# Patient Record
Sex: MALE | Race: WHITE | NOT HISPANIC OR LATINO | Employment: OTHER | URBAN - METROPOLITAN AREA
[De-identification: names, ages, dates, MRNs, and addresses within clinical notes are randomized per-mention and may not be internally consistent; named-entity substitution may affect disease eponyms.]

---

## 2017-01-03 RX ORDER — GABAPENTIN 600 MG/1
TABLET ORAL
Qty: 120 TABLET | Refills: 0 | Status: SHIPPED | OUTPATIENT
Start: 2017-01-03 | End: 2017-06-30 | Stop reason: SDUPTHER

## 2017-01-04 RX ORDER — DICYCLOMINE HYDROCHLORIDE 10 MG/1
CAPSULE ORAL
Qty: 120 CAPSULE | Refills: 1 | Status: SHIPPED | OUTPATIENT
Start: 2017-01-04

## 2017-01-31 ENCOUNTER — OFFICE VISIT (OUTPATIENT)
Dept: FAMILY MEDICINE CLINIC | Facility: CLINIC | Age: 62
End: 2017-01-31

## 2017-01-31 VITALS
RESPIRATION RATE: 16 BRPM | HEIGHT: 71 IN | OXYGEN SATURATION: 98 % | HEART RATE: 80 BPM | WEIGHT: 193 LBS | BODY MASS INDEX: 27.02 KG/M2 | DIASTOLIC BLOOD PRESSURE: 70 MMHG | TEMPERATURE: 98.3 F | SYSTOLIC BLOOD PRESSURE: 118 MMHG

## 2017-01-31 DIAGNOSIS — G89.29 CHRONIC LOW BACK PAIN WITHOUT SCIATICA, UNSPECIFIED BACK PAIN LATERALITY: Primary | ICD-10-CM

## 2017-01-31 DIAGNOSIS — M54.50 CHRONIC LOW BACK PAIN WITHOUT SCIATICA, UNSPECIFIED BACK PAIN LATERALITY: Primary | ICD-10-CM

## 2017-01-31 DIAGNOSIS — J01.10 ACUTE NON-RECURRENT FRONTAL SINUSITIS: ICD-10-CM

## 2017-01-31 PROBLEM — J01.90 ACUTE SINUSITIS: Status: ACTIVE | Noted: 2017-01-31

## 2017-01-31 PROCEDURE — 99213 OFFICE O/P EST LOW 20 MIN: CPT | Performed by: INTERNAL MEDICINE

## 2017-01-31 RX ORDER — HYDROCODONE BITARTRATE AND ACETAMINOPHEN 10; 325 MG/1; MG/1
1 TABLET ORAL
Qty: 150 TABLET | Refills: 0 | Status: SHIPPED | OUTPATIENT
Start: 2017-01-31 | End: 2017-03-08 | Stop reason: SDUPTHER

## 2017-01-31 RX ORDER — AMOXICILLIN AND CLAVULANATE POTASSIUM 875; 125 MG/1; MG/1
1 TABLET, FILM COATED ORAL 2 TIMES DAILY
Qty: 20 TABLET | Refills: 0 | Status: SHIPPED | OUTPATIENT
Start: 2017-01-31 | End: 2017-06-30

## 2017-01-31 NOTE — MR AVS SNAPSHOT
Octavio Espinoza   1/31/2017 9:45 AM   Office Visit    Dept Phone:  [phone]   Encounter #:  [id number]    Provider:  Freddie Coreas MD   Department:  [facility]                Your Full Care Plan              Today's Medication Changes          These changes are accurate as of: 1/31/17 11:12 AM.  If you have any questions, ask your nurse or doctor.               New Medication(s)Ordered:     amoxicillin-clavulanate 875-125 MG per tablet   Commonly known as:  AUGMENTIN   Take 1 tablet by mouth 2 (Two) Times a Day.   Started by:  Freddie Coreas MD            Where to Get Your Medications      These medications were sent to [facility]/pharmacy #[id number] - [place], [place] - [address] - [phone]  - [phone]   [address], [facility] [address]     Phone:  [phone]     amoxicillin-clavulanate 875-125 MG per tablet         You can get these medications from any pharmacy     Bring a paper prescription for each of these medications     HYDROcodone-acetaminophen  MG per tablet                  Your Updated Medication List          This list is accurate as of: 1/31/17 11:12 AM.  Always use your most recent med list.                albuterol 108 (90 BASE) MCG/ACT inhaler   Commonly known as:  PROVENTIL HFA;VENTOLIN HFA   Inhale 2 puffs every 4 (four) hours as needed for wheezing.       ALPRAZolam 1 MG tablet   Commonly known as:  XANAX   Take 1 tablet by mouth daily as needed for anxiety.       amoxicillin-clavulanate 875-125 MG per tablet   Commonly known as:  AUGMENTIN   Take 1 tablet by mouth 2 (Two) Times a Day.       buPROPion  MG 12 hr tablet   Commonly known as:  WELLBUTRIN SR   TAKE 1 TABLET BY MOUTH EVERY DAY       dicyclomine 10 MG capsule   Commonly known as:  BENTYL   TAKE 1 CAPSULE BY MOUTH 4 TIMES DAILY BEFORE MEALS AND AT BEDTIME       finasteride 5 MG tablet   Commonly known as:  PROSCAR   Take 1 tablet by  mouth Daily.       * gabapentin 600 MG tablet   Commonly known as:  NEURONTIN   Take 1 tablet by mouth 4 (Four) Times a Day.       * gabapentin 600 MG tablet   Commonly known as:  NEURONTIN   TAKE 1 TABLET BY MOUTH 4 TIMES A DAY       HYDROcodone-acetaminophen  MG per tablet   Commonly known as:  NORCO   Take 1 tablet by mouth Every 3 (Three) Hours.       methylphenidate 10 MG tablet   Commonly known as:  RITALIN   Take 1 tablet by mouth 2 (Two) Times a Day.       terazosin 10 MG capsule   Commonly known as:  HYTRIN   Take 1 capsule by mouth Every Night.       * Notice:  This list has 2 medication(s) that are the same as other medications prescribed for you. Read the directions carefully, and ask your doctor or other care provider to review them with you.            Instructions     None    Patient Instructions History      Upcoming Appointments     Visit Type Date Time Department    OFFICE VISIT 2017  9:45 AM [employer] Signup     [facility] [employer] allows you to send messages to your doctor, view your test results, renew your prescriptions, schedule appointments, and more. To sign up, go to [employer] and click on the Sign Up Now link in the New User? box. Enter your [employer] Activation Code exactly as it appears below along with the last four digits of your Social Security Number and your Date of Birth () to complete the sign-up process. If you do not sign up before the expiration date, you must request a new code.    [employer] Activation Code: [email or web address]  Expires: 2017 11:12 AM    If you have questions, you can email [email or web address] or call [phone] to talk to our [employer] staff. Remember, [employer] is NOT to be used for urgent needs. For medical emergencies, dial 911.               Other Info from Your Visit           Allergies     No Known Allergies      Reason for Visit     Cough cough and congestion       Vital Signs     Blood  "Pressure Pulse Temperature Respirations Height Weight    118/70 80 98.3 °F (36.8 °C) (Oral) 16 70.5\" (179.1 cm) 193 lb (87.5 kg)    Oxygen Saturation Body Mass Index Smoking Status             98% 27.3 kg/m2 Never Smoker           "

## 2017-01-31 NOTE — PROGRESS NOTES
Subjective   Octavio Espinoza is a 62 y.o. male. Patient is here today for   Chief Complaint   Patient presents with   • Cough     cough and congestion           Vitals:    01/31/17 0922   BP: 118/70   Pulse: 80   Resp: 16   Temp: 98.3 °F (36.8 °C)   SpO2: 98%       Past Medical History   Diagnosis Date   • ADHD (attention deficit hyperactivity disorder)    • Anxiety    • Back pain    • DDD (degenerative disc disease), cervical    • Depression    • Erectile dysfunction       No Known Allergies   Social History     Social History   • Marital status:      Spouse name: N/A   • Number of children: N/A   • Years of education: N/A     Occupational History   • Not on file.     Social History Main Topics   • Smoking status: Never Smoker   • Smokeless tobacco: Not on file   • Alcohol use No   • Drug use: Not on file   • Sexual activity: Not on file     Other Topics Concern   • Not on file     Social History Narrative        Current Outpatient Prescriptions:   •  albuterol (PROVENTIL HFA;VENTOLIN HFA) 108 (90 BASE) MCG/ACT inhaler, Inhale 2 puffs every 4 (four) hours as needed for wheezing., Disp: 1 inhaler, Rfl: 11  •  ALPRAZolam (XANAX) 1 MG tablet, Take 1 tablet by mouth daily as needed for anxiety., Disp: 30 tablet, Rfl: 2  •  buPROPion SR (WELLBUTRIN SR) 150 MG 12 hr tablet, TAKE 1 TABLET BY MOUTH EVERY DAY, Disp: 30 tablet, Rfl: 2  •  dicyclomine (BENTYL) 10 MG capsule, TAKE 1 CAPSULE BY MOUTH 4 TIMES DAILY BEFORE MEALS AND AT BEDTIME, Disp: 120 capsule, Rfl: 1  •  finasteride (PROSCAR) 5 MG tablet, Take 1 tablet by mouth Daily., Disp: 90 tablet, Rfl: 3  •  gabapentin (NEURONTIN) 600 MG tablet, Take 1 tablet by mouth 4 (Four) Times a Day., Disp: 360 tablet, Rfl: 3  •  gabapentin (NEURONTIN) 600 MG tablet, TAKE 1 TABLET BY MOUTH 4 TIMES A DAY, Disp: 120 tablet, Rfl: 0  •  HYDROcodone-acetaminophen (NORCO)  MG per tablet, Take 1 tablet by mouth Every 3 (Three) Hours., Disp: 150 tablet, Rfl: 0  •   methylphenidate (RITALIN) 10 MG tablet, Take 1 tablet by mouth 2 (Two) Times a Day., Disp: 60 tablet, Rfl: 0  •  terazosin (HYTRIN) 10 MG capsule, Take 1 capsule by mouth Every Night., Disp: 90 capsule, Rfl: 3  •  amoxicillin-clavulanate (AUGMENTIN) 875-125 MG per tablet, Take 1 tablet by mouth 2 (Two) Times a Day., Disp: 20 tablet, Rfl: 0     Objective     HPI Comments: He has an acute issue and a chronic one today.  Acutely, he has had some right and left maxillary sinus pain and pressure with purulent nasal drainage for about 10 days.  His get some drainage in the back of his throat although he denies a sore throat or ear pain.    He has chronic cervical spine pain and lumbar spine pain.  He takes hydrocodone 10/325 3 times a day when necessary for this.  He requested refill on his narcotic pain medication.    Cough   Associated symptoms include postnasal drip and rhinorrhea. Pertinent negatives include no ear pain, sore throat or shortness of breath.        Review of Systems   Constitutional: Negative.    HENT: Positive for congestion, postnasal drip, rhinorrhea and sinus pressure. Negative for dental problem, ear discharge, ear pain, facial swelling, hearing loss and sore throat.    Respiratory: Positive for cough. Negative for choking and shortness of breath.    Psychiatric/Behavioral: Negative.        Physical Exam   Constitutional: He is oriented to person, place, and time.   HENT:   Head: Normocephalic and atraumatic.   Right Ear: External ear normal.   Left Ear: External ear normal.   He had some pain on palpation the right maxillary sinus.   Cardiovascular: Normal rate and regular rhythm.    Pulmonary/Chest: Effort normal and breath sounds normal.   Neurological: He is alert and oriented to person, place, and time.   Psychiatric: He has a normal mood and affect. His behavior is normal.   Nursing note and vitals reviewed.        Problem List Items Addressed This Visit        Respiratory    Acute sinusitis        Nervous and Auditory    Low back pain - Primary            PLAN  I refilled his hydrocodone 10/325 3 times a day when necessary for his chronic low back pain.    I asked him to take Augmentin 875 one by mouth twice a day #20 with no refills.  I cautioned him that this medication might cause diarrhea.    He could decrease the chance that he would have diarrhea and would be more likely to tolerate this medication if he took it with meals or bites of food.  No Follow-up on file.

## 2017-03-08 ENCOUNTER — TELEPHONE (OUTPATIENT)
Dept: FAMILY MEDICINE CLINIC | Facility: CLINIC | Age: 62
End: 2017-03-08

## 2017-03-08 RX ORDER — HYDROCODONE BITARTRATE AND ACETAMINOPHEN 10; 325 MG/1; MG/1
1 TABLET ORAL
Qty: 150 TABLET | Refills: 0 | Status: SHIPPED | OUTPATIENT
Start: 2017-03-08 | End: 2017-04-07 | Stop reason: SDUPTHER

## 2017-03-08 NOTE — TELEPHONE ENCOUNTER
SPOKE TO PATIENT AND INFORMED HIM THAT THE PRESCRIPTION WAS READY FOR    ----- Message from Joanna Gonzalez sent at 3/8/2017 12:17 PM EST -----  NEEDS A WRITTEN RX     VICODIN 10/325 # 150     PLEASE CALL PT WHEN READY TO      [phone]

## 2017-03-13 RX ORDER — BUPROPION HYDROCHLORIDE 150 MG/1
TABLET, EXTENDED RELEASE ORAL
Qty: 30 TABLET | Refills: 0 | Status: SHIPPED | OUTPATIENT
Start: 2017-03-13 | End: 2017-04-08 | Stop reason: SDUPTHER

## 2017-04-07 ENCOUNTER — OFFICE VISIT (OUTPATIENT)
Dept: FAMILY MEDICINE CLINIC | Facility: CLINIC | Age: 62
End: 2017-04-07

## 2017-04-07 VITALS
WEIGHT: 195 LBS | SYSTOLIC BLOOD PRESSURE: 120 MMHG | BODY MASS INDEX: 27.3 KG/M2 | DIASTOLIC BLOOD PRESSURE: 78 MMHG | HEART RATE: 72 BPM | RESPIRATION RATE: 16 BRPM | OXYGEN SATURATION: 98 % | TEMPERATURE: 98.3 F | HEIGHT: 71 IN

## 2017-04-07 DIAGNOSIS — M54.50 CHRONIC LOW BACK PAIN WITHOUT SCIATICA, UNSPECIFIED BACK PAIN LATERALITY: Primary | ICD-10-CM

## 2017-04-07 DIAGNOSIS — R41.840 ATTENTION OR CONCENTRATION DEFICIT: ICD-10-CM

## 2017-04-07 DIAGNOSIS — G89.29 CHRONIC LOW BACK PAIN WITHOUT SCIATICA, UNSPECIFIED BACK PAIN LATERALITY: Primary | ICD-10-CM

## 2017-04-07 PROCEDURE — 99213 OFFICE O/P EST LOW 20 MIN: CPT | Performed by: INTERNAL MEDICINE

## 2017-04-07 RX ORDER — HYDROCODONE BITARTRATE AND ACETAMINOPHEN 10; 325 MG/1; MG/1
1 TABLET ORAL
Qty: 150 TABLET | Refills: 0 | Status: SHIPPED | OUTPATIENT
Start: 2017-04-07 | End: 2017-05-02 | Stop reason: SDUPTHER

## 2017-04-07 RX ORDER — METHYLPHENIDATE HYDROCHLORIDE 10 MG/1
10 TABLET ORAL 2 TIMES DAILY
Qty: 60 TABLET | Refills: 0 | Status: SHIPPED | OUTPATIENT
Start: 2017-04-07 | End: 2017-12-10

## 2017-04-07 NOTE — PROGRESS NOTES
Subjective   Octavio Espinoza is a 62 y.o. male. Patient is here today for   Chief Complaint   Patient presents with   • Back Pain     med check    • ADD          Vitals:    04/07/17 1035   BP: 120/78   Pulse: 72   Resp: 16   Temp: 98.3 °F (36.8 °C)   SpO2: 98%       Past Medical History:   Diagnosis Date   • ADHD (attention deficit hyperactivity disorder)    • Anxiety    • Back pain    • DDD (degenerative disc disease), cervical    • Depression    • Erectile dysfunction       No Known Allergies   Social History     Social History   • Marital status:      Spouse name: N/A   • Number of children: N/A   • Years of education: N/A     Occupational History   • Not on file.     Social History Main Topics   • Smoking status: Never Smoker   • Smokeless tobacco: Not on file   • Alcohol use No   • Drug use: Not on file   • Sexual activity: Not on file     Other Topics Concern   • Not on file     Social History Narrative        Current Outpatient Prescriptions:   •  albuterol (PROVENTIL HFA;VENTOLIN HFA) 108 (90 BASE) MCG/ACT inhaler, Inhale 2 puffs every 4 (four) hours as needed for wheezing., Disp: 1 inhaler, Rfl: 11  •  ALPRAZolam (XANAX) 1 MG tablet, Take 1 tablet by mouth daily as needed for anxiety., Disp: 30 tablet, Rfl: 2  •  amoxicillin-clavulanate (AUGMENTIN) 875-125 MG per tablet, Take 1 tablet by mouth 2 (Two) Times a Day., Disp: 20 tablet, Rfl: 0  •  buPROPion SR (WELLBUTRIN SR) 150 MG 12 hr tablet, TAKE 1 TABLET BY MOUTH EVERY DAY, Disp: 30 tablet, Rfl: 0  •  dicyclomine (BENTYL) 10 MG capsule, TAKE 1 CAPSULE BY MOUTH 4 TIMES DAILY BEFORE MEALS AND AT BEDTIME, Disp: 120 capsule, Rfl: 1  •  finasteride (PROSCAR) 5 MG tablet, Take 1 tablet by mouth Daily., Disp: 90 tablet, Rfl: 3  •  gabapentin (NEURONTIN) 600 MG tablet, Take 1 tablet by mouth 4 (Four) Times a Day., Disp: 360 tablet, Rfl: 3  •  gabapentin (NEURONTIN) 600 MG tablet, TAKE 1 TABLET BY MOUTH 4 TIMES A DAY, Disp: 120 tablet, Rfl: 0  •   HYDROcodone-acetaminophen (NORCO)  MG per tablet, Take 1 tablet by mouth Every 3 (Three) Hours., Disp: 150 tablet, Rfl: 0  •  methylphenidate (RITALIN) 10 MG tablet, Take 1 tablet by mouth 2 (Two) Times a Day., Disp: 60 tablet, Rfl: 0  •  terazosin (HYTRIN) 10 MG capsule, Take 1 capsule by mouth Every Night., Disp: 90 capsule, Rfl: 3     Objective     HPI Comments: He is here to get a refill on his Adderall and hydrocodone/acetaminophen 10/325.  He takes 5 or fewer these on a daily basis.  He has chronic lumbar spine pain.  He feels this medication worked well for him.    She also feels his Adderall works well in managing his ADD.    Physically he feels well.  Mentally he feels his doing well also.    Back Pain     ADD          Review of Systems   Constitutional: Negative.    HENT: Negative.    Respiratory: Negative.    Gastrointestinal: Negative.    Musculoskeletal: Positive for back pain.       Physical Exam   Constitutional: He is oriented to person, place, and time.   HENT:   Head: Normocephalic and atraumatic.   Cardiovascular: Normal rate and regular rhythm.    Pulmonary/Chest: Effort normal.   Neurological: He is alert and oriented to person, place, and time.   Psychiatric: He has a normal mood and affect. His behavior is normal.   Nursing note and vitals reviewed.        Problem List Items Addressed This Visit        Nervous and Auditory    Low back pain - Primary       Other    Attention or concentration deficit    Relevant Medications    methylphenidate (RITALIN) 10 MG tablet            PLAN  His chronic low back pain is relatively well managed with hydrocodone/acetaminophen 5/325 one by mouth 5 times a day when necessary.  I refilled this medication for him.    Her refilled his Ritalin for him today.    I asked him to follow-up in about 3 months.  He ought to have a yearly comprehensive physical exam.  No Follow-up on file.

## 2017-04-10 RX ORDER — BUPROPION HYDROCHLORIDE 150 MG/1
TABLET, EXTENDED RELEASE ORAL
Qty: 30 TABLET | Refills: 0 | Status: SHIPPED | OUTPATIENT
Start: 2017-04-10 | End: 2017-05-08 | Stop reason: SDUPTHER

## 2017-05-02 RX ORDER — HYDROCODONE BITARTRATE AND ACETAMINOPHEN 10; 325 MG/1; MG/1
1 TABLET ORAL
Qty: 150 TABLET | Refills: 0 | Status: SHIPPED | OUTPATIENT
Start: 2017-05-02 | End: 2017-06-01 | Stop reason: SDUPTHER

## 2017-05-03 ENCOUNTER — TELEPHONE (OUTPATIENT)
Dept: FAMILY MEDICINE CLINIC | Facility: CLINIC | Age: 62
End: 2017-05-03

## 2017-05-08 RX ORDER — BUPROPION HYDROCHLORIDE 150 MG/1
TABLET, EXTENDED RELEASE ORAL
Qty: 30 TABLET | Refills: 0 | Status: SHIPPED | OUTPATIENT
Start: 2017-05-08 | End: 2017-06-05 | Stop reason: SDUPTHER

## 2017-06-01 RX ORDER — HYDROCODONE BITARTRATE AND ACETAMINOPHEN 10; 325 MG/1; MG/1
1 TABLET ORAL
Qty: 150 TABLET | Refills: 0 | Status: SHIPPED | OUTPATIENT
Start: 2017-06-01 | End: 2017-06-30 | Stop reason: SDUPTHER

## 2017-06-02 ENCOUNTER — TELEPHONE (OUTPATIENT)
Dept: FAMILY MEDICINE CLINIC | Facility: CLINIC | Age: 62
End: 2017-06-02

## 2017-06-02 NOTE — TELEPHONE ENCOUNTER
Left voicemail for patient that the prescription was ready for    ----- Message from Donna Santamaria sent at 6/1/2017 11:59 AM EDT -----  PT NEEDS SCRIPT REFILL FOR HYDROcodone-acetaminophen (NORCO)  MG Take 1 tablet by mouth Every 3 (Three) Hours #150    PLEASE CONTACT PT WHEN READY FOR  [phone]

## 2017-06-05 RX ORDER — BUPROPION HYDROCHLORIDE 150 MG/1
TABLET, EXTENDED RELEASE ORAL
Qty: 30 TABLET | Refills: 0 | Status: SHIPPED | OUTPATIENT
Start: 2017-06-05 | End: 2017-06-30 | Stop reason: SDUPTHER

## 2017-06-30 ENCOUNTER — OFFICE VISIT (OUTPATIENT)
Dept: FAMILY MEDICINE CLINIC | Facility: CLINIC | Age: 62
End: 2017-06-30

## 2017-06-30 VITALS
OXYGEN SATURATION: 97 % | WEIGHT: 190 LBS | BODY MASS INDEX: 26.6 KG/M2 | SYSTOLIC BLOOD PRESSURE: 122 MMHG | HEIGHT: 71 IN | DIASTOLIC BLOOD PRESSURE: 72 MMHG | HEART RATE: 74 BPM | RESPIRATION RATE: 16 BRPM | TEMPERATURE: 98.3 F

## 2017-06-30 DIAGNOSIS — R68.82 LIBIDO, DECREASED: ICD-10-CM

## 2017-06-30 DIAGNOSIS — R63.5 WEIGHT GAIN: ICD-10-CM

## 2017-06-30 DIAGNOSIS — R53.83 FATIGUE, UNSPECIFIED TYPE: ICD-10-CM

## 2017-06-30 DIAGNOSIS — R14.0 ABDOMINAL DISTENSION: Primary | ICD-10-CM

## 2017-06-30 DIAGNOSIS — G89.29 CHRONIC LOW BACK PAIN WITHOUT SCIATICA, UNSPECIFIED BACK PAIN LATERALITY: ICD-10-CM

## 2017-06-30 DIAGNOSIS — M54.50 CHRONIC LOW BACK PAIN WITHOUT SCIATICA, UNSPECIFIED BACK PAIN LATERALITY: ICD-10-CM

## 2017-06-30 DIAGNOSIS — R19.4 CHANGE IN BOWEL HABITS: ICD-10-CM

## 2017-06-30 PROCEDURE — 99214 OFFICE O/P EST MOD 30 MIN: CPT | Performed by: INTERNAL MEDICINE

## 2017-06-30 RX ORDER — BUPROPION HYDROCHLORIDE 150 MG/1
150 TABLET, EXTENDED RELEASE ORAL 2 TIMES DAILY
Qty: 60 TABLET | Refills: 5 | Status: SHIPPED | OUTPATIENT
Start: 2017-06-30 | End: 2017-08-21 | Stop reason: SDUPTHER

## 2017-06-30 RX ORDER — GABAPENTIN 600 MG/1
600 TABLET ORAL 4 TIMES DAILY
Qty: 120 TABLET | Refills: 5 | Status: SHIPPED | OUTPATIENT
Start: 2017-06-30 | End: 2017-06-30

## 2017-06-30 RX ORDER — GABAPENTIN 600 MG/1
600 TABLET ORAL 4 TIMES DAILY
Qty: 360 TABLET | Refills: 3 | Status: SHIPPED | OUTPATIENT
Start: 2017-06-30 | End: 2017-07-06 | Stop reason: SDUPTHER

## 2017-06-30 RX ORDER — HYDROCODONE BITARTRATE AND ACETAMINOPHEN 10; 325 MG/1; MG/1
1 TABLET ORAL
Qty: 150 TABLET | Refills: 0 | Status: SHIPPED | OUTPATIENT
Start: 2017-06-30 | End: 2017-07-26 | Stop reason: SDUPTHER

## 2017-06-30 NOTE — PROGRESS NOTES
Subjective   Octavio Espinoza is a 62 y.o. male. Patient is here today for   Chief Complaint   Patient presents with   • Back Pain     med check   • Diarrhea     still having GI issues           Vitals:    06/30/17 1258   BP: 122/72   Pulse: 74   Resp: 16   Temp: 98.3 °F (36.8 °C)   SpO2: 97%       Past Medical History:   Diagnosis Date   • ADHD (attention deficit hyperactivity disorder)    • Anxiety    • Back pain    • DDD (degenerative disc disease), cervical    • Depression    • Erectile dysfunction       No Known Allergies   Social History     Social History   • Marital status:      Spouse name: N/A   • Number of children: N/A   • Years of education: N/A     Occupational History   • Not on file.     Social History Main Topics   • Smoking status: Never Smoker   • Smokeless tobacco: Not on file   • Alcohol use No   • Drug use: Not on file   • Sexual activity: Not on file     Other Topics Concern   • Not on file     Social History Narrative        Current Outpatient Prescriptions:   •  albuterol (PROVENTIL HFA;VENTOLIN HFA) 108 (90 BASE) MCG/ACT inhaler, Inhale 2 puffs every 4 (four) hours as needed for wheezing., Disp: 1 inhaler, Rfl: 11  •  buPROPion SR (WELLBUTRIN SR) 150 MG 12 hr tablet, Take 1 tablet by mouth 2 (Two) Times a Day., Disp: 60 tablet, Rfl: 5  •  dicyclomine (BENTYL) 10 MG capsule, TAKE 1 CAPSULE BY MOUTH 4 TIMES DAILY BEFORE MEALS AND AT BEDTIME, Disp: 120 capsule, Rfl: 1  •  finasteride (PROSCAR) 5 MG tablet, Take 1 tablet by mouth Daily., Disp: 90 tablet, Rfl: 3  •  gabapentin (NEURONTIN) 600 MG tablet, Take 1 tablet by mouth 4 (Four) Times a Day., Disp: 360 tablet, Rfl: 3  •  HYDROcodone-acetaminophen (NORCO)  MG per tablet, Take 1 tablet by mouth Every 3 (Three) Hours., Disp: 150 tablet, Rfl: 0  •  methylphenidate (RITALIN) 10 MG tablet, Take 1 tablet by mouth 2 (Two) Times a Day., Disp: 60 tablet, Rfl: 0  •  terazosin (HYTRIN) 10 MG capsule, Take 1 capsule by mouth Every Night.,  Disp: 90 capsule, Rfl: 3     Objective     HPI Comments: He continues to have chronic low back pain.    Change in bowel habits over the last several months.  He has frequent bowel movements.    He notes a decreased libido and increase in weight.    He complains of fatigue.    Back Pain     Diarrhea           Review of Systems   Constitutional: Negative.    HENT: Negative.    Respiratory: Negative.    Gastrointestinal: Positive for diarrhea.        He has frequent loose bowel movements.  The caliber of his stool is gotten smaller   Musculoskeletal: Positive for back pain.   Psychiatric/Behavioral: Negative.        Physical Exam   Constitutional: He is oriented to person, place, and time. He appears well-developed and well-nourished.   HENT:   Head: Normocephalic and atraumatic.   Cardiovascular: Normal rate and regular rhythm.    Pulmonary/Chest: Effort normal.   Neurological: He is alert and oriented to person, place, and time.   Psychiatric: He has a normal mood and affect. His behavior is normal.   Nursing note and vitals reviewed.        Problem List Items Addressed This Visit        Digestive    Change in bowel habits       Nervous and Auditory    Low back pain       Other    Libido, decreased    Relevant Medications    buPROPion SR (WELLBUTRIN SR) 150 MG 12 hr tablet      Other Visit Diagnoses     Abdominal distension    -  Primary    Relevant Orders    Ambulatory Referral to Gastroenterology    CBC & Differential    Weight gain        Relevant Orders    Comprehensive Metabolic Panel    TSH Rfx On Abnormal To Free T4    Testosterone, Free, Total    Fatigue, unspecified type        Relevant Orders    Vitamin B12            PLAN  I like to refer him to Dr. Tono Strauss regarding his change in bowel habits.    Regarding his fatigue, weight gain and decreased libido of like you following labs: B12, free and total testosterone, TSH with reflex free T4, comprehensive metabolic panel, CBC.    For his chronic low back  pain, I did refill his hydrocodone/acetaminophen 10/325.    He wanted a refill of his Ambien which she takes most nights.  I provided him a prescription.    I like to see him back in 3-4 weeks to discuss the results of his labs.  No Follow-up on file.

## 2017-07-02 LAB
ALBUMIN SERPL-MCNC: 4.7 G/DL (ref 3.5–5.2)
ALBUMIN/GLOB SERPL: 1.7 G/DL
ALP SERPL-CCNC: 72 U/L (ref 39–117)
ALT SERPL-CCNC: 21 U/L (ref 1–41)
AST SERPL-CCNC: 22 U/L (ref 1–40)
BASOPHILS # BLD AUTO: 0.04 10*3/MM3 (ref 0–0.2)
BASOPHILS NFR BLD AUTO: 0.7 % (ref 0–1.5)
BILIRUB SERPL-MCNC: 0.3 MG/DL (ref 0.1–1.2)
BUN SERPL-MCNC: 10 MG/DL (ref 8–23)
BUN/CREAT SERPL: 8.1 (ref 7–25)
CALCIUM SERPL-MCNC: 10.2 MG/DL (ref 8.6–10.5)
CHLORIDE SERPL-SCNC: 102 MMOL/L (ref 98–107)
CO2 SERPL-SCNC: 27.5 MMOL/L (ref 22–29)
CONV COMMENT: NORMAL
CREAT SERPL-MCNC: 1.24 MG/DL (ref 0.76–1.27)
EOSINOPHIL # BLD AUTO: 0.17 10*3/MM3 (ref 0–0.7)
EOSINOPHIL NFR BLD AUTO: 3 % (ref 0.3–6.2)
ERYTHROCYTE [DISTWIDTH] IN BLOOD BY AUTOMATED COUNT: 12.9 % (ref 11.5–14.5)
GLOBULIN SER CALC-MCNC: 2.7 GM/DL
GLUCOSE SERPL-MCNC: 66 MG/DL (ref 65–99)
HCT VFR BLD AUTO: 44.9 % (ref 40.4–52.2)
HGB BLD-MCNC: 15.2 G/DL (ref 13.7–17.6)
IMM GRANULOCYTES # BLD: 0 10*3/MM3 (ref 0–0.03)
IMM GRANULOCYTES NFR BLD: 0 % (ref 0–0.5)
LYMPHOCYTES # BLD AUTO: 2.08 10*3/MM3 (ref 0.9–4.8)
LYMPHOCYTES NFR BLD AUTO: 36.2 % (ref 19.6–45.3)
MCH RBC QN AUTO: 31.7 PG (ref 27–32.7)
MCHC RBC AUTO-ENTMCNC: 33.9 G/DL (ref 32.6–36.4)
MCV RBC AUTO: 93.7 FL (ref 79.8–96.2)
MONOCYTES # BLD AUTO: 0.36 10*3/MM3 (ref 0.2–1.2)
MONOCYTES NFR BLD AUTO: 6.3 % (ref 5–12)
NEUTROPHILS # BLD AUTO: 3.09 10*3/MM3 (ref 1.9–8.1)
NEUTROPHILS NFR BLD AUTO: 53.8 % (ref 42.7–76)
PLATELET # BLD AUTO: 170 10*3/MM3 (ref 140–500)
POTASSIUM SERPL-SCNC: 4.3 MMOL/L (ref 3.5–5.2)
PROT SERPL-MCNC: 7.4 G/DL (ref 6–8.5)
RBC # BLD AUTO: 4.79 10*6/MM3 (ref 4.6–6)
SODIUM SERPL-SCNC: 145 MMOL/L (ref 136–145)
TESTOST FREE SERPL-MCNC: 7.1 PG/ML (ref 6.6–18.1)
TESTOST SERPL-MCNC: 761 NG/DL (ref 348–1197)
TSH SERPL DL<=0.005 MIU/L-ACNC: 1.66 MIU/ML (ref 0.27–4.2)
VIT B12 SERPL-MCNC: 805 PG/ML (ref 211–946)
WBC # BLD AUTO: 5.74 10*3/MM3 (ref 4.5–10.7)

## 2017-07-05 RX ORDER — BUPROPION HYDROCHLORIDE 150 MG/1
TABLET, EXTENDED RELEASE ORAL
Qty: 30 TABLET | Refills: 0 | Status: SHIPPED | OUTPATIENT
Start: 2017-07-05 | End: 2017-08-21 | Stop reason: SDUPTHER

## 2017-07-06 ENCOUNTER — TELEPHONE (OUTPATIENT)
Dept: FAMILY MEDICINE CLINIC | Facility: CLINIC | Age: 62
End: 2017-07-06

## 2017-07-06 RX ORDER — GABAPENTIN 600 MG/1
600 TABLET ORAL 4 TIMES DAILY
Qty: 360 TABLET | Refills: 3 | Status: SHIPPED | OUTPATIENT
Start: 2017-07-06 | End: 2018-01-17 | Stop reason: SDUPTHER

## 2017-07-11 RX ORDER — GABAPENTIN 600 MG/1
TABLET ORAL
Qty: 360 TABLET | Refills: 3 | OUTPATIENT
Start: 2017-07-11

## 2017-07-26 ENCOUNTER — TELEPHONE (OUTPATIENT)
Dept: FAMILY MEDICINE CLINIC | Facility: CLINIC | Age: 62
End: 2017-07-26

## 2017-07-26 RX ORDER — HYDROCODONE BITARTRATE AND ACETAMINOPHEN 10; 325 MG/1; MG/1
1 TABLET ORAL
Qty: 150 TABLET | Refills: 0 | Status: SHIPPED | OUTPATIENT
Start: 2017-07-26 | End: 2017-08-24 | Stop reason: SDUPTHER

## 2017-07-26 NOTE — TELEPHONE ENCOUNTER
Left voicemail for patient that the prescription was ready for    ----- Message from Afshan Chandler sent at 7/26/2017 11:21 AM EDT -----  SCRIPT FOR VICODIN    PLEASE CALL PT WHEN READY [phone]

## 2017-08-03 ENCOUNTER — TELEPHONE (OUTPATIENT)
Dept: FAMILY MEDICINE CLINIC | Facility: CLINIC | Age: 62
End: 2017-08-03

## 2017-08-03 NOTE — TELEPHONE ENCOUNTER
I spoke with the patient earlier and he explained to me that he wanted a referral for both physical therapy and neurosurgery, or possibly an MRI to be ordered.  I explained to him that after speaking to Dr. Coreas he said he would do the physical therapy referral, but would need to see him for anything else.  Patient expressed understanding and decided to just do physical therapy right now and will contact us if he feels like he needs to be seen  ----- Message from Agustin Lee MA sent at 8/3/2017 11:21 AM EDT -----  PT WAS CALLING BACK TO SPEAK WITH YOU. PLEASE CALL HIM BACK @ [phone].

## 2017-08-03 NOTE — TELEPHONE ENCOUNTER
Left voicemail for patient asking him to call me back.  I wanted to clarify where the pain was and if he had a preference on where he would like to go   ----- Message from Afshan Chandler sent at 8/2/2017  2:20 PM EDT -----  WOULD LIKE TO HAVE A REFERRAL FOR HIS LOWER BACK. SAYS SOMETHING HAPPENED TO IT.    PLEASE CALL PT BACK WITH ANY QUESTIONS [phone]

## 2017-08-17 ENCOUNTER — TELEPHONE (OUTPATIENT)
Dept: FAMILY MEDICINE CLINIC | Facility: CLINIC | Age: 62
End: 2017-08-17

## 2017-08-17 NOTE — TELEPHONE ENCOUNTER
Spoke to patient and informed him that he can't  the prescription until the 24th.  He said he was calling to remind us and has an appointment that day and will pick it up at that time   ----- Message from Donna Santamaria sent at 8/17/2017 11:36 AM EDT -----  PT NEEDS SCRIPT REFILL FOR HYDROcodone-acetaminophen (NORCO)  MG Take 1 tablet by mouth Every 3 (Three) Hours #150    PLEASE CONTACT PT WHEN READY FOR  [phone]

## 2017-08-21 ENCOUNTER — OFFICE VISIT (OUTPATIENT)
Dept: GASTROENTEROLOGY | Facility: CLINIC | Age: 62
End: 2017-08-21

## 2017-08-21 VITALS
WEIGHT: 191 LBS | BODY MASS INDEX: 26.74 KG/M2 | HEART RATE: 74 BPM | SYSTOLIC BLOOD PRESSURE: 127 MMHG | DIASTOLIC BLOOD PRESSURE: 79 MMHG | HEIGHT: 71 IN

## 2017-08-21 DIAGNOSIS — R19.5 HEME POSITIVE STOOL: ICD-10-CM

## 2017-08-21 DIAGNOSIS — Z86.010 HISTORY OF COLON POLYPS: Primary | ICD-10-CM

## 2017-08-21 PROCEDURE — 99204 OFFICE O/P NEW MOD 45 MIN: CPT | Performed by: INTERNAL MEDICINE

## 2017-08-21 RX ORDER — BUPROPION HYDROCHLORIDE 150 MG/1
150 TABLET, EXTENDED RELEASE ORAL 2 TIMES DAILY
Qty: 180 TABLET | Refills: 3 | Status: SHIPPED | OUTPATIENT
Start: 2017-08-21 | End: 2019-02-20 | Stop reason: SDUPTHER

## 2017-08-21 NOTE — PROGRESS NOTES
Subjective   Octavio Espinoza is a 62 y.o. male is being seen for consultation today at the request of No ref. provider found    History of Present Illness  Chief Complaint   Patient presents with   • GI Problem     change in BMs, Heme positive stool 2016     Patient underwent routine screening colonoscopy in April 2015 by Dr. Wade Mulligan.  This examination demonstrated a 3 mm polyp which was removed, pathology showed a tubular adenoma, low-grade dysplasia.  Patient underwent Hemoccult testing in October 2016 and 1 of these test was positive.  No other lesion was noted at the time of colonoscopy such as hemorrhoids.  The patient has had no GI symptoms.  He is now here for further evaluation.  The following portions of the patient's history were reviewed and updated as appropriate: allergies, current medications, past family history, past medical history, past social history, past surgical history and problem list.    Review of Systems   Constitutional: Negative for appetite change, diaphoresis, fatigue, fever and unexpected weight change.   HENT: Negative for hearing loss, mouth sores, sore throat and trouble swallowing.    Eyes: Negative for pain and redness.   Respiratory: Negative for choking and shortness of breath.    Cardiovascular: Negative for chest pain and leg swelling.   Gastrointestinal: Negative for abdominal distention, abdominal pain, anal bleeding, blood in stool, constipation, diarrhea, nausea, rectal pain and vomiting.   Genitourinary: Negative for flank pain and hematuria.   Musculoskeletal: Negative for arthralgias and joint swelling.   Skin: Negative for color change and rash.   Allergic/Immunologic: Negative for food allergies and immunocompromised state.   Neurological: Negative for dizziness, seizures and headaches.   Hematological: Does not bruise/bleed easily.   Psychiatric/Behavioral: Negative for confusion, sleep disturbance and suicidal ideas. The patient is not nervous/anxious.         Objective   Physical Exam   Constitutional: He is oriented to person, place, and time. He appears well-developed and well-nourished.   HENT:   Head: Normocephalic and atraumatic.   Nose: Nose normal.   Eyes: Conjunctivae are normal. Pupils are equal, round, and reactive to light.   Neck: Normal range of motion. Neck supple. No thyromegaly present.   Cardiovascular: Normal heart sounds.  Exam reveals no gallop and no friction rub.    No murmur heard.  Pulmonary/Chest: Effort normal and breath sounds normal.   Abdominal: Soft. Bowel sounds are normal. He exhibits no distension and no mass. There is no tenderness.   Musculoskeletal: He exhibits no edema.   Lymphadenopathy:     He has no cervical adenopathy.   Neurological: He is alert and oriented to person, place, and time.   Skin: No rash noted. No erythema.   Psychiatric: He has a normal mood and affect. His behavior is normal.   Nursing note and vitals reviewed.        Assessment/Plan   Problems Addressed this Visit        Digestive    Heme positive stool       Other    History of colon polyps - Primary        Always hard to know what to do with somebody with Hemoccult positive stool and a fairly recent colonoscopy by a competent practitioner.  We will repeat his Hemoccult tests.  If they're negative, I would follow conservatively and advised him to have his colonoscopy updated in a couple of years.  If they're positive, I would push ahead with full colonoscopy.

## 2017-08-24 ENCOUNTER — OFFICE VISIT (OUTPATIENT)
Dept: FAMILY MEDICINE CLINIC | Facility: CLINIC | Age: 62
End: 2017-08-24

## 2017-08-24 VITALS
HEIGHT: 71 IN | SYSTOLIC BLOOD PRESSURE: 128 MMHG | BODY MASS INDEX: 27.02 KG/M2 | WEIGHT: 193 LBS | RESPIRATION RATE: 16 BRPM | OXYGEN SATURATION: 96 % | TEMPERATURE: 98 F | DIASTOLIC BLOOD PRESSURE: 72 MMHG | HEART RATE: 62 BPM

## 2017-08-24 DIAGNOSIS — G89.29 CHRONIC LOW BACK PAIN WITHOUT SCIATICA, UNSPECIFIED BACK PAIN LATERALITY: Primary | ICD-10-CM

## 2017-08-24 DIAGNOSIS — M54.50 CHRONIC LOW BACK PAIN WITHOUT SCIATICA, UNSPECIFIED BACK PAIN LATERALITY: Primary | ICD-10-CM

## 2017-08-24 PROCEDURE — 99213 OFFICE O/P EST LOW 20 MIN: CPT | Performed by: INTERNAL MEDICINE

## 2017-08-24 RX ORDER — HYDROCODONE BITARTRATE AND ACETAMINOPHEN 10; 325 MG/1; MG/1
1 TABLET ORAL
Qty: 150 TABLET | Refills: 0 | Status: SHIPPED | OUTPATIENT
Start: 2017-08-24 | End: 2017-09-20 | Stop reason: SDUPTHER

## 2017-08-27 NOTE — PROGRESS NOTES
Subjective   Octavio Espinoza is a 62 y.o. male. Patient is here today for   Chief Complaint   Patient presents with   • Back Pain     MED CHECK   • Earache     RIGHT EAR PAIN           Vitals:    08/24/17 1424   BP: 128/72   Pulse: 62   Resp: 16   Temp: 98 °F (36.7 °C)   SpO2: 96%       Past Medical History:   Diagnosis Date   • ADHD (attention deficit hyperactivity disorder)    • Anxiety    • Back pain    • DDD (degenerative disc disease), cervical    • Depression    • Erectile dysfunction    • History of colon polyps       No Known Allergies   Social History     Social History   • Marital status:      Spouse name: N/A   • Number of children: N/A   • Years of education: N/A     Occupational History   • Not on file.     Social History Main Topics   • Smoking status: Former Smoker     Quit date: 1980   • Smokeless tobacco: Never Used   • Alcohol use No   • Drug use: No   • Sexual activity: Not on file     Other Topics Concern   • Not on file     Social History Narrative        Current Outpatient Prescriptions:   •  albuterol (PROVENTIL HFA;VENTOLIN HFA) 108 (90 BASE) MCG/ACT inhaler, Inhale 2 puffs every 4 (four) hours as needed for wheezing., Disp: 1 inhaler, Rfl: 11  •  buPROPion SR (WELLBUTRIN SR) 150 MG 12 hr tablet, Take 1 tablet by mouth 2 (Two) Times a Day., Disp: 180 tablet, Rfl: 3  •  dicyclomine (BENTYL) 10 MG capsule, TAKE 1 CAPSULE BY MOUTH 4 TIMES DAILY BEFORE MEALS AND AT BEDTIME, Disp: 120 capsule, Rfl: 1  •  finasteride (PROSCAR) 5 MG tablet, Take 1 tablet by mouth Daily., Disp: 90 tablet, Rfl: 3  •  gabapentin (NEURONTIN) 600 MG tablet, Take 1 tablet by mouth 4 (Four) Times a Day., Disp: 360 tablet, Rfl: 3  •  methylphenidate (RITALIN) 10 MG tablet, Take 1 tablet by mouth 2 (Two) Times a Day., Disp: 60 tablet, Rfl: 0  •  terazosin (HYTRIN) 10 MG capsule, Take 1 capsule by mouth Every Night., Disp: 90 capsule, Rfl: 3  •  HYDROcodone-acetaminophen (NORCO)  MG per tablet, Take 1 tablet by  mouth Every 3 (Three) Hours., Disp: 150 tablet, Rfl: 0     Objective     HPI Comments: He had a tooth pulled recently on the right jaw.  This was a molar.  He's had a little bit of pain in his right ear since then he thought maybe it might be the tooth.    He has chronic back pain and requested refill on hydrocodone 10/325 one by mouth 5 times a day.    Back Pain     Earache           Review of Systems   Constitutional: Negative.    HENT: Positive for ear pain.    Cardiovascular: Negative.    Musculoskeletal: Positive for back pain.   Psychiatric/Behavioral: Negative.        Physical Exam   Constitutional: He is oriented to person, place, and time. He appears well-nourished.   HENT:   Head: Normocephalic and atraumatic.   Right Ear: External ear normal.   Left Ear: External ear normal.   Neck: No tracheal deviation present. No thyromegaly present.   Pulmonary/Chest: Effort normal.   Lymphadenopathy:     He has no cervical adenopathy.   Neurological: He is alert and oriented to person, place, and time.   Psychiatric: He has a normal mood and affect. His behavior is normal.   Nursing note and vitals reviewed.        Problem List Items Addressed This Visit        Nervous and Auditory    Low back pain - Primary            PLAN  He feels his chronic low back pain is relatively well-controlled on hydrocodone and I refilled this medication for him today.    His right ear pain is probably secondary to having had his tooth: She suspects.  He doesn't have an abnormality on his examination of his right external auditory canal or tympanic membrane.    Follow-up for a comprehensive physical examination once yearly.  No Follow-up on file.

## 2017-08-30 ENCOUNTER — TELEPHONE (OUTPATIENT)
Dept: GASTROENTEROLOGY | Facility: CLINIC | Age: 62
End: 2017-08-30

## 2017-09-20 ENCOUNTER — TELEPHONE (OUTPATIENT)
Dept: FAMILY MEDICINE CLINIC | Facility: CLINIC | Age: 62
End: 2017-09-20

## 2017-09-20 RX ORDER — HYDROCODONE BITARTRATE AND ACETAMINOPHEN 10; 325 MG/1; MG/1
1 TABLET ORAL
Qty: 150 TABLET | Refills: 0 | Status: SHIPPED | OUTPATIENT
Start: 2017-09-20 | End: 2017-10-16 | Stop reason: SDUPTHER

## 2017-09-20 NOTE — TELEPHONE ENCOUNTER
Spoke to patient and informed him that the prescription was ready for    ----- Message from Agustin Lee MA sent at 9/20/2017  8:19 AM EDT -----      ----- Message -----     From: Flor Kingsley MA     Sent: 9/19/2017   7:37 AM       To: Agustin Lee MA        ----- Message -----     From: Agustin Lee MA     Sent: 9/18/2017  11:39 AM       To: Flor Kingsley MA        ----- Message -----     From: Donna Santamaria     Sent: 9/18/2017  10:49 AM       To: Agustin Lee MA    PT NEEDS SCRIPT REFILL FOR HYDROcodone-acetaminophen (NORCO)  MG Take 1 tablet by mouth Every 3 (Three) Hours #150    PLEASE CONTACT PT WHEN READY FOR  [phone]

## 2017-10-16 RX ORDER — HYDROCODONE BITARTRATE AND ACETAMINOPHEN 10; 325 MG/1; MG/1
1 TABLET ORAL
Qty: 150 TABLET | Refills: 0 | Status: SHIPPED | OUTPATIENT
Start: 2017-10-16 | End: 2017-11-08 | Stop reason: SDUPTHER

## 2017-10-17 ENCOUNTER — TELEPHONE (OUTPATIENT)
Dept: FAMILY MEDICINE CLINIC | Facility: CLINIC | Age: 62
End: 2017-10-17

## 2017-10-17 NOTE — TELEPHONE ENCOUNTER
LEFT VOICEMAIL FOR PATIENT THAT THE PRESCRIPTION WAS READY FOR    ----- Message from Donna Santamaria sent at 10/16/2017 11:54 AM EDT -----  PT NEEDS SCRIPT REFILL FOR HYDROcodone-acetaminophen (NORCO)  MG Take 1 tablet by mouth Every 3 (Three) Hours #150    PLEASE CONTACT PT WHEN READY FOR  [phone]

## 2017-11-08 ENCOUNTER — OFFICE VISIT (OUTPATIENT)
Dept: FAMILY MEDICINE CLINIC | Facility: CLINIC | Age: 62
End: 2017-11-08

## 2017-11-08 VITALS
OXYGEN SATURATION: 98 % | DIASTOLIC BLOOD PRESSURE: 80 MMHG | HEART RATE: 72 BPM | RESPIRATION RATE: 16 BRPM | BODY MASS INDEX: 27.58 KG/M2 | SYSTOLIC BLOOD PRESSURE: 138 MMHG | HEIGHT: 71 IN | TEMPERATURE: 98 F | WEIGHT: 197 LBS

## 2017-11-08 DIAGNOSIS — M54.50 CHRONIC LOW BACK PAIN WITHOUT SCIATICA, UNSPECIFIED BACK PAIN LATERALITY: Primary | ICD-10-CM

## 2017-11-08 DIAGNOSIS — G89.29 CHRONIC LOW BACK PAIN WITHOUT SCIATICA, UNSPECIFIED BACK PAIN LATERALITY: Primary | ICD-10-CM

## 2017-11-08 PROCEDURE — 99213 OFFICE O/P EST LOW 20 MIN: CPT | Performed by: INTERNAL MEDICINE

## 2017-11-08 RX ORDER — HYDROCODONE BITARTRATE AND ACETAMINOPHEN 10; 325 MG/1; MG/1
1 TABLET ORAL
Qty: 150 TABLET | Refills: 0 | Status: SHIPPED | OUTPATIENT
Start: 2017-11-08 | End: 2017-11-08 | Stop reason: SDUPTHER

## 2017-11-08 RX ORDER — HYDROCODONE BITARTRATE AND ACETAMINOPHEN 10; 325 MG/1; MG/1
1 TABLET ORAL
Qty: 150 TABLET | Refills: 0 | Status: SHIPPED | OUTPATIENT
Start: 2017-11-08 | End: 2017-12-06 | Stop reason: SDUPTHER

## 2017-11-08 NOTE — PROGRESS NOTES
Subjective   Octavio Espinoza is a 62 y.o. male. Patient is here today for   Chief Complaint   Patient presents with   • Pain     med check           Vitals:    11/08/17 1345   BP: 138/80   Pulse: 72   Resp: 16   Temp: 98 °F (36.7 °C)   SpO2: 98%       Past Medical History:   Diagnosis Date   • ADHD (attention deficit hyperactivity disorder)    • Anxiety    • Back pain    • DDD (degenerative disc disease), cervical    • Depression    • Erectile dysfunction    • History of colon polyps       No Known Allergies   Social History     Social History   • Marital status:      Spouse name: N/A   • Number of children: N/A   • Years of education: N/A     Occupational History   • Not on file.     Social History Main Topics   • Smoking status: Former Smoker     Quit date: 1980   • Smokeless tobacco: Never Used   • Alcohol use No   • Drug use: No   • Sexual activity: Not on file     Other Topics Concern   • Not on file     Social History Narrative        Current Outpatient Prescriptions:   •  albuterol (PROVENTIL HFA;VENTOLIN HFA) 108 (90 BASE) MCG/ACT inhaler, Inhale 2 puffs every 4 (four) hours as needed for wheezing., Disp: 1 inhaler, Rfl: 11  •  buPROPion SR (WELLBUTRIN SR) 150 MG 12 hr tablet, Take 1 tablet by mouth 2 (Two) Times a Day., Disp: 180 tablet, Rfl: 3  •  dicyclomine (BENTYL) 10 MG capsule, TAKE 1 CAPSULE BY MOUTH 4 TIMES DAILY BEFORE MEALS AND AT BEDTIME, Disp: 120 capsule, Rfl: 1  •  finasteride (PROSCAR) 5 MG tablet, Take 1 tablet by mouth Daily., Disp: 90 tablet, Rfl: 3  •  gabapentin (NEURONTIN) 600 MG tablet, Take 1 tablet by mouth 4 (Four) Times a Day., Disp: 360 tablet, Rfl: 3  •  HYDROcodone-acetaminophen (NORCO)  MG per tablet, Take 1 tablet by mouth Every 3 (Three) Hours., Disp: 150 tablet, Rfl: 0  •  methylphenidate (RITALIN) 10 MG tablet, Take 1 tablet by mouth 2 (Two) Times a Day., Disp: 60 tablet, Rfl: 0  •  terazosin (HYTRIN) 10 MG capsule, Take 1 capsule by mouth Every Night., Disp: 90  capsule, Rfl: 3     Objective     HPI Comments: He has chronic lumbar spine pain and requested refill on his hydrocodone.    He feels well besides.    He tells me that he would like to make appointment for a comprehensive physical exam.  I told him to follow-up on this.  He will make an appointment.    Pain          Review of Systems   Constitutional: Negative.    HENT: Negative.    Respiratory: Negative.    Cardiovascular: Negative.    Musculoskeletal:        He has chronic lumbar spine pain.  He feels his pain is well-controlled.   Psychiatric/Behavioral: Negative.        Physical Exam   Constitutional: He is oriented to person, place, and time. He appears well-developed and well-nourished.   HENT:   Head: Normocephalic and atraumatic.   Cardiovascular: Normal rate and regular rhythm.    Pulmonary/Chest: Effort normal.   Neurological: He is alert and oriented to person, place, and time.   Psychiatric: He has a normal mood and affect. His behavior is normal.   Nursing note and vitals reviewed.        Problem List Items Addressed This Visit        Nervous and Auditory    Low back pain - Primary            PLAN  I refilled his narcotic analgesic.    I asked him to follow-up for a physical examination at his convenience.  No Follow-up on file.

## 2017-11-16 RX ORDER — TERAZOSIN 10 MG/1
CAPSULE ORAL
Qty: 90 CAPSULE | Refills: 3 | Status: SHIPPED | OUTPATIENT
Start: 2017-11-16 | End: 2018-01-17 | Stop reason: SDUPTHER

## 2017-11-27 DIAGNOSIS — Z13.220 SCREENING FOR LIPID DISORDERS: Primary | ICD-10-CM

## 2017-11-27 DIAGNOSIS — N40.0 ENLARGED PROSTATE: ICD-10-CM

## 2017-11-28 LAB
ALBUMIN SERPL-MCNC: 4.5 G/DL (ref 3.5–5.2)
ALBUMIN/GLOB SERPL: 2 G/DL
ALP SERPL-CCNC: 85 U/L (ref 39–117)
ALT SERPL-CCNC: 30 U/L (ref 1–41)
APPEARANCE UR: CLEAR
AST SERPL-CCNC: 23 U/L (ref 1–40)
BACTERIA #/AREA URNS HPF: NORMAL /HPF
BILIRUB SERPL-MCNC: 0.3 MG/DL (ref 0.1–1.2)
BILIRUB UR QL STRIP: NEGATIVE
BUN SERPL-MCNC: 15 MG/DL (ref 8–23)
BUN/CREAT SERPL: 10.9 (ref 7–25)
CALCIUM SERPL-MCNC: 9.6 MG/DL (ref 8.6–10.5)
CASTS URNS MICRO: NORMAL
CHLORIDE SERPL-SCNC: 102 MMOL/L (ref 98–107)
CHOLEST SERPL-MCNC: 194 MG/DL (ref 0–200)
CO2 SERPL-SCNC: 27.3 MMOL/L (ref 22–29)
COLOR UR: YELLOW
CREAT SERPL-MCNC: 1.38 MG/DL (ref 0.76–1.27)
EPI CELLS #/AREA URNS HPF: NORMAL /HPF
GFR SERPLBLD CREATININE-BSD FMLA CKD-EPI: 52 ML/MIN/1.73
GFR SERPLBLD CREATININE-BSD FMLA CKD-EPI: 63 ML/MIN/1.73
GLOBULIN SER CALC-MCNC: 2.3 GM/DL
GLUCOSE SERPL-MCNC: 104 MG/DL (ref 65–99)
GLUCOSE UR QL: NEGATIVE
HDLC SERPL-MCNC: 51 MG/DL (ref 40–60)
HGB UR QL STRIP: NEGATIVE
KETONES UR QL STRIP: NEGATIVE
LDLC SERPL CALC-MCNC: 122 MG/DL (ref 0–100)
LDLC/HDLC SERPL: 2.39 {RATIO}
LEUKOCYTE ESTERASE UR QL STRIP: NEGATIVE
NITRITE UR QL STRIP: NEGATIVE
PH UR STRIP: 6 [PH] (ref 5–8)
POTASSIUM SERPL-SCNC: 4.7 MMOL/L (ref 3.5–5.2)
PROT SERPL-MCNC: 6.8 G/DL (ref 6–8.5)
PROT UR QL STRIP: NEGATIVE
PSA SERPL-MCNC: 1.36 NG/ML (ref 0–4)
RBC #/AREA URNS HPF: NORMAL /HPF
SODIUM SERPL-SCNC: 142 MMOL/L (ref 136–145)
SP GR UR: 1.02 (ref 1–1.03)
TRIGL SERPL-MCNC: 105 MG/DL (ref 0–150)
UROBILINOGEN UR STRIP-MCNC: (no result) MG/DL
VLDLC SERPL CALC-MCNC: 21 MG/DL (ref 5–40)
WBC #/AREA URNS HPF: NORMAL /HPF

## 2017-12-06 ENCOUNTER — OFFICE VISIT (OUTPATIENT)
Dept: FAMILY MEDICINE CLINIC | Facility: CLINIC | Age: 62
End: 2017-12-06

## 2017-12-06 VITALS
OXYGEN SATURATION: 98 % | HEIGHT: 71 IN | BODY MASS INDEX: 27.58 KG/M2 | DIASTOLIC BLOOD PRESSURE: 68 MMHG | WEIGHT: 197 LBS | SYSTOLIC BLOOD PRESSURE: 115 MMHG | TEMPERATURE: 98.2 F | HEART RATE: 70 BPM | RESPIRATION RATE: 16 BRPM

## 2017-12-06 DIAGNOSIS — G89.29 CHRONIC LOW BACK PAIN WITHOUT SCIATICA, UNSPECIFIED BACK PAIN LATERALITY: Primary | ICD-10-CM

## 2017-12-06 DIAGNOSIS — M54.50 CHRONIC LOW BACK PAIN WITHOUT SCIATICA, UNSPECIFIED BACK PAIN LATERALITY: Primary | ICD-10-CM

## 2017-12-06 DIAGNOSIS — R73.9 HYPERGLYCEMIA: ICD-10-CM

## 2017-12-06 PROCEDURE — 99213 OFFICE O/P EST LOW 20 MIN: CPT | Performed by: INTERNAL MEDICINE

## 2017-12-06 RX ORDER — HYDROCODONE BITARTRATE AND ACETAMINOPHEN 10; 325 MG/1; MG/1
1 TABLET ORAL
Qty: 150 TABLET | Refills: 0 | Status: SHIPPED | OUTPATIENT
Start: 2017-12-06 | End: 2018-01-03 | Stop reason: SDUPTHER

## 2017-12-10 PROBLEM — R73.9 HYPERGLYCEMIA: Status: ACTIVE | Noted: 2017-12-10

## 2017-12-10 NOTE — PROGRESS NOTES
Subjective   Octavio Espinoza is a 62 y.o. male. Patient is here today for   Chief Complaint   Patient presents with   • Hyperlipidemia     lab f/u          Vitals:    12/06/17 1522   BP: 115/68   Pulse: 70   Resp: 16   Temp: 98.2 °F (36.8 °C)   SpO2: 98%       Past Medical History:   Diagnosis Date   • ADHD (attention deficit hyperactivity disorder)    • Anxiety    • Back pain    • DDD (degenerative disc disease), cervical    • Depression    • Erectile dysfunction    • History of colon polyps       No Known Allergies   Social History     Social History   • Marital status:      Spouse name: N/A   • Number of children: N/A   • Years of education: N/A     Occupational History   • Not on file.     Social History Main Topics   • Smoking status: Former Smoker     Quit date: 1980   • Smokeless tobacco: Never Used   • Alcohol use No   • Drug use: No   • Sexual activity: Not on file     Other Topics Concern   • Not on file     Social History Narrative        Current Outpatient Prescriptions:   •  albuterol (PROVENTIL HFA;VENTOLIN HFA) 108 (90 BASE) MCG/ACT inhaler, Inhale 2 puffs every 4 (four) hours as needed for wheezing., Disp: 1 inhaler, Rfl: 11  •  buPROPion SR (WELLBUTRIN SR) 150 MG 12 hr tablet, Take 1 tablet by mouth 2 (Two) Times a Day., Disp: 180 tablet, Rfl: 3  •  dicyclomine (BENTYL) 10 MG capsule, TAKE 1 CAPSULE BY MOUTH 4 TIMES DAILY BEFORE MEALS AND AT BEDTIME, Disp: 120 capsule, Rfl: 1  •  finasteride (PROSCAR) 5 MG tablet, Take 1 tablet by mouth Daily., Disp: 90 tablet, Rfl: 3  •  gabapentin (NEURONTIN) 600 MG tablet, Take 1 tablet by mouth 4 (Four) Times a Day., Disp: 360 tablet, Rfl: 3  •  HYDROcodone-acetaminophen (NORCO)  MG per tablet, Take 1 tablet by mouth Every 3 (Three) Hours., Disp: 150 tablet, Rfl: 0  •  terazosin (HYTRIN) 10 MG capsule, TAKE 1 CAPSULE BY MOUTH EVERY NIGHT, Disp: 90 capsule, Rfl: 3     Objective     HPI Comments: He is here today to follow-up with  hypercholesterolemia.    He continues to have severe chronic lumbar spine pain.  He is exercising regularly.  He feels his pain is relatively well-controlled with exercise and hydrocodone..    He stopped taking Adderall.  He doesn't feel he needs it at this point for his ADD.    Hyperlipidemia          Review of Systems   Constitutional: Negative.    HENT: Negative.    Respiratory: Negative.    Cardiovascular: Negative.    Musculoskeletal:        He has severe chronic lumbar spine pain without sciatica.   Psychiatric/Behavioral: Negative.        Physical Exam   Constitutional: He is oriented to person, place, and time. He appears well-developed and well-nourished.   Pleasant, neatly groomed, BMI 27.   HENT:   Head: Normocephalic and atraumatic.   Pulmonary/Chest: Effort normal.   Neurological: He is alert and oriented to person, place, and time.   Psychiatric: He has a normal mood and affect. His behavior is normal.   Nursing note and vitals reviewed.        Problem List Items Addressed This Visit        Nervous and Auditory    Low back pain - Primary       Other    Hyperglycemia            PLAN  He and I reviewed his labs.  His LDL cholesterol target is less than 1:30 and he is achieve that.  In the event that he were to have any vascular abnormality, his target should be less than 100.  He should get a vascular screening if he has not had one done.    His severe chronic back pain seems to be relatively well-controlled with his regular exercise, gabapentin and with hydrocodone.  I refilled his hydrocodone today.    He has hyperglycemia.  We'll just monitor that for now.    He ought follow-up for a once yearly comprehensive physical exam.  No Follow-up on file.

## 2018-01-03 ENCOUNTER — TELEPHONE (OUTPATIENT)
Dept: FAMILY MEDICINE CLINIC | Facility: CLINIC | Age: 63
End: 2018-01-03

## 2018-01-03 RX ORDER — HYDROCODONE BITARTRATE AND ACETAMINOPHEN 10; 325 MG/1; MG/1
1 TABLET ORAL
Qty: 150 TABLET | Refills: 0 | Status: SHIPPED | OUTPATIENT
Start: 2018-01-03 | End: 2018-02-01 | Stop reason: SDUPTHER

## 2018-01-03 NOTE — TELEPHONE ENCOUNTER
SPOKE TO PATIENT AND INFORMED HIM THAT THE PRESCRIPTION WAS READY FOR    ----- Message from Kathi Gutiérrez MA sent at 1/2/2018  1:59 PM EST -----  Contact: PT  PT NEEDS RX REFILL FOR HYDROCODONE  MG         PT CAN BE REACHED [phone]

## 2018-01-17 RX ORDER — GABAPENTIN 600 MG/1
600 TABLET ORAL 4 TIMES DAILY
Qty: 360 TABLET | Refills: 3 | Status: SHIPPED | OUTPATIENT
Start: 2018-01-17 | End: 2018-08-08 | Stop reason: SDUPTHER

## 2018-01-17 RX ORDER — TERAZOSIN 10 MG/1
10 CAPSULE ORAL NIGHTLY
Qty: 90 CAPSULE | Refills: 3 | Status: SHIPPED | OUTPATIENT
Start: 2018-01-17 | End: 2018-01-18 | Stop reason: SDUPTHER

## 2018-01-18 RX ORDER — TERAZOSIN 10 MG/1
10 CAPSULE ORAL NIGHTLY
Qty: 90 CAPSULE | Refills: 3 | Status: SHIPPED | OUTPATIENT
Start: 2018-01-18 | End: 2019-03-29 | Stop reason: SDUPTHER

## 2018-02-01 ENCOUNTER — TELEPHONE (OUTPATIENT)
Dept: FAMILY MEDICINE CLINIC | Facility: CLINIC | Age: 63
End: 2018-02-01

## 2018-02-01 RX ORDER — HYDROCODONE BITARTRATE AND ACETAMINOPHEN 10; 325 MG/1; MG/1
1 TABLET ORAL
Qty: 150 TABLET | Refills: 0 | Status: SHIPPED | OUTPATIENT
Start: 2018-02-01 | End: 2018-02-21 | Stop reason: SDUPTHER

## 2018-02-01 NOTE — TELEPHONE ENCOUNTER
-----patient informed rx ready for      Message from Kathi Gutiérrez MA sent at 1/31/2018 11:55 AM EST -----  Contact: pt  PT NEEDS RX REFILL FOR HYDROCODONE  MG  Pt can be reached at [phone]

## 2018-02-21 ENCOUNTER — OFFICE VISIT (OUTPATIENT)
Dept: FAMILY MEDICINE CLINIC | Facility: CLINIC | Age: 63
End: 2018-02-21

## 2018-02-21 VITALS
TEMPERATURE: 98.9 F | DIASTOLIC BLOOD PRESSURE: 72 MMHG | HEIGHT: 71 IN | OXYGEN SATURATION: 98 % | SYSTOLIC BLOOD PRESSURE: 100 MMHG | HEART RATE: 63 BPM | WEIGHT: 195.6 LBS | RESPIRATION RATE: 16 BRPM | BODY MASS INDEX: 27.38 KG/M2

## 2018-02-21 DIAGNOSIS — M54.50 CHRONIC LOW BACK PAIN WITHOUT SCIATICA, UNSPECIFIED BACK PAIN LATERALITY: Primary | ICD-10-CM

## 2018-02-21 DIAGNOSIS — G89.29 CHRONIC LOW BACK PAIN WITHOUT SCIATICA, UNSPECIFIED BACK PAIN LATERALITY: Primary | ICD-10-CM

## 2018-02-21 PROCEDURE — 99213 OFFICE O/P EST LOW 20 MIN: CPT | Performed by: INTERNAL MEDICINE

## 2018-02-21 RX ORDER — INFLUENZA VIRUS VACCINE 15; 15; 15; 15 UG/.5ML; UG/.5ML; UG/.5ML; UG/.5ML
SUSPENSION INTRAMUSCULAR
COMMUNITY
Start: 2017-11-17

## 2018-02-21 RX ORDER — HYDROCODONE BITARTRATE AND ACETAMINOPHEN 10; 325 MG/1; MG/1
1 TABLET ORAL
Qty: 150 TABLET | Refills: 0 | Status: SHIPPED | OUTPATIENT
Start: 2018-02-21 | End: 2018-03-26 | Stop reason: SDUPTHER

## 2018-02-27 NOTE — PROGRESS NOTES
Subjective   Octavio Espinoza is a 63 y.o. male. Patient is here today for   Chief Complaint   Patient presents with   • Pain     refill hydrocodone           Vitals:    02/21/18 1446   BP: 100/72   Pulse: 63   Resp: 16   Temp: 98.9 °F (37.2 °C)   SpO2: 98%       Past Medical History:   Diagnosis Date   • ADHD (attention deficit hyperactivity disorder)    • Anxiety    • Back pain    • DDD (degenerative disc disease), cervical    • Depression    • Erectile dysfunction    • History of colon polyps       No Known Allergies   Social History     Social History   • Marital status:      Spouse name: N/A   • Number of children: N/A   • Years of education: N/A     Occupational History   • Not on file.     Social History Main Topics   • Smoking status: Former Smoker     Quit date: 1980   • Smokeless tobacco: Never Used   • Alcohol use No   • Drug use: No   • Sexual activity: Not on file     Other Topics Concern   • Not on file     Social History Narrative   • No narrative on file        Current Outpatient Prescriptions:   •  albuterol (PROVENTIL HFA;VENTOLIN HFA) 108 (90 BASE) MCG/ACT inhaler, Inhale 2 puffs every 4 (four) hours as needed for wheezing., Disp: 1 inhaler, Rfl: 11  •  buPROPion SR (WELLBUTRIN SR) 150 MG 12 hr tablet, Take 1 tablet by mouth 2 (Two) Times a Day., Disp: 180 tablet, Rfl: 3  •  dicyclomine (BENTYL) 10 MG capsule, TAKE 1 CAPSULE BY MOUTH 4 TIMES DAILY BEFORE MEALS AND AT BEDTIME, Disp: 120 capsule, Rfl: 1  •  finasteride (PROSCAR) 5 MG tablet, Take 1 tablet by mouth Daily., Disp: 90 tablet, Rfl: 3  •  FLUARIX QUADRIVALENT 0.5 ML suspension prefilled syringe injection, , Disp: , Rfl:   •  gabapentin (NEURONTIN) 600 MG tablet, Take 1 tablet by mouth 4 (Four) Times a Day., Disp: 360 tablet, Rfl: 3  •  HYDROcodone-acetaminophen (NORCO)  MG per tablet, Take 1 tablet by mouth Every 3 (Three) Hours., Disp: 150 tablet, Rfl: 0  •  terazosin (HYTRIN) 10 MG capsule, Take 1 capsule by mouth Every  Night., Disp: 90 capsule, Rfl: 3     Objective     HPI Comments: He has severe chronic lumbar spine pain.  He takes hydrocodone 10/325 3 times a day for this and he finds it works well for him.  He requested refill this medication.    He tells me that he feels well otherwise.    Pain          Review of Systems   Constitutional: Negative.    HENT: Negative.    Respiratory: Negative.    Cardiovascular: Negative.    Musculoskeletal: Negative.    Psychiatric/Behavioral: Negative.        Physical Exam   Constitutional: He is oriented to person, place, and time. He appears well-developed and well-nourished.   HENT:   Head: Normocephalic and atraumatic.   Pulmonary/Chest: Effort normal.   Neurological: He is alert and oriented to person, place, and time.   Psychiatric: He has a normal mood and affect. His behavior is normal.   Nursing note and vitals reviewed.        Problem List Items Addressed This Visit        Nervous and Auditory    Low back pain - Primary            PLAN  Refilled his hydrocodone today.    He ought follow-up for Medicare wellness visit once yearly.  No Follow-up on file.

## 2018-03-26 RX ORDER — HYDROCODONE BITARTRATE AND ACETAMINOPHEN 10; 325 MG/1; MG/1
1 TABLET ORAL
Qty: 150 TABLET | Refills: 0 | Status: SHIPPED | OUTPATIENT
Start: 2018-03-26 | End: 2018-04-17 | Stop reason: SDUPTHER

## 2018-03-27 ENCOUNTER — TELEPHONE (OUTPATIENT)
Dept: FAMILY MEDICINE CLINIC | Facility: CLINIC | Age: 63
End: 2018-03-27

## 2018-03-27 NOTE — TELEPHONE ENCOUNTER
COMPLETED. CALLED AMIE AND GAVE DIAGNOSIS.   ----- Message from Kathi Gutiérrez MA sent at 3/27/2018  2:10 PM EDT -----  Contact: AMIE WITH [facility]  AMIE CAN BE REACHED [phone]    PT HAS BEEN ON RX FOR NORCO FOR A WHILE     PHARMACIST IS NEEDING A DIAGNOSIS FOR THE PT

## 2018-03-27 NOTE — TELEPHONE ENCOUNTER
LEFT MESSAGE FOR PT LETTING HIM KNOW HIS SCRIPT WAS READY FOR .     ----- Message from Donna Santamaria sent at 3/26/2018 11:22 AM EDT -----  PT NEEDS SCRIPT REFILL FOR HYDROcodone-acetaminophen (NORCO)  MG Take 1 tablet by mouth Every 3 (Three) Hours #150.    PLEASE CONTACT PT WHEN READY FOR  [phone]

## 2018-04-17 RX ORDER — HYDROCODONE BITARTRATE AND ACETAMINOPHEN 10; 325 MG/1; MG/1
1 TABLET ORAL
Qty: 150 TABLET | Refills: 0 | Status: SHIPPED | OUTPATIENT
Start: 2018-04-17 | End: 2018-05-17

## 2018-05-17 ENCOUNTER — OFFICE VISIT (OUTPATIENT)
Dept: FAMILY MEDICINE CLINIC | Facility: CLINIC | Age: 63
End: 2018-05-17

## 2018-05-17 VITALS
OXYGEN SATURATION: 98 % | WEIGHT: 198.2 LBS | BODY MASS INDEX: 27.75 KG/M2 | HEIGHT: 71 IN | HEART RATE: 66 BPM | RESPIRATION RATE: 16 BRPM | TEMPERATURE: 97.5 F | SYSTOLIC BLOOD PRESSURE: 102 MMHG | DIASTOLIC BLOOD PRESSURE: 70 MMHG

## 2018-05-17 DIAGNOSIS — G89.29 CHRONIC LOW BACK PAIN WITHOUT SCIATICA, UNSPECIFIED BACK PAIN LATERALITY: ICD-10-CM

## 2018-05-17 DIAGNOSIS — R41.840 ATTENTION OR CONCENTRATION DEFICIT: Primary | ICD-10-CM

## 2018-05-17 DIAGNOSIS — M54.50 CHRONIC LOW BACK PAIN WITHOUT SCIATICA, UNSPECIFIED BACK PAIN LATERALITY: ICD-10-CM

## 2018-05-17 PROCEDURE — 99213 OFFICE O/P EST LOW 20 MIN: CPT | Performed by: INTERNAL MEDICINE

## 2018-05-17 RX ORDER — FLUTICASONE PROPIONATE 110 UG/1
1 AEROSOL, METERED RESPIRATORY (INHALATION)
Qty: 12 G | Refills: 5 | Status: SHIPPED | OUTPATIENT
Start: 2018-05-17

## 2018-05-17 RX ORDER — METHYLPHENIDATE HYDROCHLORIDE 10 MG/1
10 TABLET ORAL EVERY 12 HOURS SCHEDULED
Qty: 60 TABLET | Refills: 0 | Status: SHIPPED | OUTPATIENT
Start: 2018-05-17 | End: 2018-05-17

## 2018-05-17 RX ORDER — METHYLPHENIDATE HYDROCHLORIDE 20 MG/1
20 TABLET ORAL EVERY 12 HOURS SCHEDULED
Qty: 60 TABLET | Refills: 0 | Status: SHIPPED | OUTPATIENT
Start: 2018-05-17 | End: 2018-08-29 | Stop reason: SDUPTHER

## 2018-05-17 RX ORDER — HYDROCODONE BITARTRATE AND ACETAMINOPHEN 10; 325 MG/1; MG/1
1 TABLET ORAL
Qty: 150 TABLET | Refills: 0 | Status: SHIPPED | OUTPATIENT
Start: 2018-05-17 | End: 2018-06-18 | Stop reason: SDUPTHER

## 2018-05-24 ENCOUNTER — TELEPHONE (OUTPATIENT)
Dept: FAMILY MEDICINE CLINIC | Facility: CLINIC | Age: 63
End: 2018-05-24

## 2018-05-24 NOTE — TELEPHONE ENCOUNTER
ATTMEPTED TO CALL PT LM AND WAITING FOR CALL BACK. DR. WADSWORTH SUGGESTED TRYING [facility]. PLEASE LET ME KNOW IF WOULD LIKE ME TO CALL IT IN.   ----- Message from Kathi Gutiérrez MA sent at 5/21/2018  3:44 PM EDT -----  Contact: PT  PT WAS PRESCRIBED fluticasone (FLOVENT HFA) 110 MCG/ACT inhaler HE SAYS IT IS TOO EXPENSIVE AND WANTS TO KNOW IF ANY OF THE SAMPLE INHALERS WE HAVE WOULD WORK JUST AS WELL OR IF ANOTHER RX COULD BE SENT IN FOR SOMETHING CHEAPER/ HE SAID IF NOT HE STILL HAS HIS ALBUTEROL INHALER AND IT WORKS FINE FOR HIM  PT CAN BE REACHED AT   [phone]

## 2018-05-25 ENCOUNTER — TELEPHONE (OUTPATIENT)
Dept: FAMILY MEDICINE CLINIC | Facility: CLINIC | Age: 63
End: 2018-05-25

## 2018-05-25 DIAGNOSIS — R06.02 SOB (SHORTNESS OF BREATH): Primary | ICD-10-CM

## 2018-05-25 NOTE — PROGRESS NOTES
Subjective   Octavio Espinoza is a 63 y.o. male. Patient is here today for   Chief Complaint   Patient presents with   • Pain     refill hydrocodone           Vitals:    05/17/18 1351   BP: 102/70   Pulse: 66   Resp: 16   Temp: 97.5 °F (36.4 °C)   SpO2: 98%       Past Medical History:   Diagnosis Date   • ADHD (attention deficit hyperactivity disorder)    • Anxiety    • Back pain    • DDD (degenerative disc disease), cervical    • Depression    • Erectile dysfunction    • History of colon polyps       No Known Allergies   Social History     Social History   • Marital status:      Spouse name: N/A   • Number of children: N/A   • Years of education: N/A     Occupational History   • Not on file.     Social History Main Topics   • Smoking status: Former Smoker     Quit date: 1980   • Smokeless tobacco: Never Used   • Alcohol use No   • Drug use: No   • Sexual activity: Not on file     Other Topics Concern   • Not on file     Social History Narrative   • No narrative on file        Current Outpatient Prescriptions:   •  albuterol (PROVENTIL HFA;VENTOLIN HFA) 108 (90 BASE) MCG/ACT inhaler, Inhale 2 puffs every 4 (four) hours as needed for wheezing., Disp: 1 inhaler, Rfl: 11  •  buPROPion SR (WELLBUTRIN SR) 150 MG 12 hr tablet, Take 1 tablet by mouth 2 (Two) Times a Day., Disp: 180 tablet, Rfl: 3  •  dicyclomine (BENTYL) 10 MG capsule, TAKE 1 CAPSULE BY MOUTH 4 TIMES DAILY BEFORE MEALS AND AT BEDTIME, Disp: 120 capsule, Rfl: 1  •  finasteride (PROSCAR) 5 MG tablet, Take 1 tablet by mouth Daily., Disp: 90 tablet, Rfl: 3  •  FLUARIX QUADRIVALENT 0.5 ML suspension prefilled syringe injection, , Disp: , Rfl:   •  gabapentin (NEURONTIN) 600 MG tablet, Take 1 tablet by mouth 4 (Four) Times a Day., Disp: 360 tablet, Rfl: 3  •  HYDROcodone-acetaminophen (NORCO)  MG per tablet, Take 1 tablet by mouth Every 3 (Three) Hours., Disp: 150 tablet, Rfl: 0  •  terazosin (HYTRIN) 10 MG capsule, Take 1 capsule by mouth Every  Night., Disp: 90 capsule, Rfl: 3  •  fluticasone (FLOVENT HFA) 110 MCG/ACT inhaler, Inhale 1 puff 2 (Two) Times a Day., Disp: 12 g, Rfl: 5  •  methylphenidate (RITALIN) 20 MG tablet, Take 1 tablet by mouth Every 12 (Twelve) Hours., Disp: 60 tablet, Rfl: 0     Objective     He has ADD and his done without Adderall for a while.  He thinks he needs it recently and asked me for refill.    He has chronic low back pain and takes hydrocodone pretty sparingly.  He requested refill of this.      Pain          Review of Systems   Musculoskeletal:        He has chronic lumbar spine pain.   Psychiatric/Behavioral:        He notes that his ADD is been acting up and he thinks he needs some treatment for this.       Physical Exam   Constitutional: He is oriented to person, place, and time. He appears well-developed and well-nourished.   Pleasant, neatly groomed, BMI 28.   HENT:   Head: Normocephalic and atraumatic.   Cardiovascular: Normal rate, regular rhythm and normal heart sounds.    Pulmonary/Chest: Effort normal and breath sounds normal.   Neurological: He is alert and oriented to person, place, and time.   Psychiatric: He has a normal mood and affect. His behavior is normal.   Nursing note and vitals reviewed.        Problem List Items Addressed This Visit        Nervous and Auditory    Low back pain       Other    Attention or concentration deficit - Primary            PLAN  I refilled his Adderall and hydrocodone.    I asked him to follow-up for a Medicare wellness visit once yearly.    I asked him to follow-up in 3 months or so.  No Follow-up on file.

## 2018-05-25 NOTE — TELEPHONE ENCOUNTER
ATTEMPTED TO CALL PT LM PER DR. WADSWORTH IT'S OK THAT HE GO SEE A PULMONOLOGIST. HE SUGGESTED DR. LIANG # [phone]. REF HAS ALREADY BEEN PUT IN FOR PT. PLEASE MARU WHEN ANYMOE QUESTIONS.   ----- Message from Donna Santamaria sent at 5/24/2018 11:57 AM EDT -----  PT WANTING TO KNOW IF  WANTS TO GIVE A REFERRAL FOR A PALEONTOLOGIST.  PLEASE ASK  WHAT HE WOULD LIKE TO DO.  PLEASE CONTACT PT AT  [phone]

## 2018-06-08 ENCOUNTER — TRANSCRIBE ORDERS (OUTPATIENT)
Dept: ADMINISTRATIVE | Facility: HOSPITAL | Age: 63
End: 2018-06-08

## 2018-06-08 DIAGNOSIS — R05.3 CHRONIC COUGH: Primary | ICD-10-CM

## 2018-06-08 DIAGNOSIS — R06.00 DYSPNEA, UNSPECIFIED TYPE: ICD-10-CM

## 2018-06-18 ENCOUNTER — HOSPITAL ENCOUNTER (OUTPATIENT)
Dept: CT IMAGING | Facility: HOSPITAL | Age: 63
Discharge: HOME | End: 2018-06-18
Attending: INTERNAL MEDICINE | Admitting: INTERNAL MEDICINE

## 2018-06-18 DIAGNOSIS — R06.00 DYSPNEA, UNSPECIFIED TYPE: ICD-10-CM

## 2018-06-18 DIAGNOSIS — R05.3 CHRONIC COUGH: ICD-10-CM

## 2018-06-18 PROCEDURE — 71250 CT THORAX DX C-: CPT

## 2018-06-18 RX ORDER — HYDROCODONE BITARTRATE AND ACETAMINOPHEN 10; 325 MG/1; MG/1
1 TABLET ORAL
Qty: 150 TABLET | Refills: 0 | Status: SHIPPED | OUTPATIENT
Start: 2018-06-18 | End: 2018-07-17 | Stop reason: SDUPTHER

## 2018-06-19 ENCOUNTER — TELEPHONE (OUTPATIENT)
Dept: FAMILY MEDICINE CLINIC | Facility: CLINIC | Age: 63
End: 2018-06-19

## 2018-06-19 NOTE — TELEPHONE ENCOUNTER
CALLED PT LM PRESCRIPTION IS READY FOR .   ----- Message from Kathi Gutiérrez MA sent at 6/18/2018 12:01 PM EDT -----  Contact: PT  PT NEEDS RX REFILL FOR HYDROcodone-acetaminophen (NORCO)  MG per tablet TAKE ONE EVERY THREE HRS       PT CAN BE REACHED [phone]

## 2018-06-20 ENCOUNTER — HOSPITAL ENCOUNTER (OUTPATIENT)
Dept: CT IMAGING | Facility: HOSPITAL | Age: 63
Discharge: HOME | End: 2018-06-20
Attending: INTERNAL MEDICINE | Admitting: INTERNAL MEDICINE

## 2018-06-20 ENCOUNTER — HOSPITAL ENCOUNTER (OUTPATIENT)
Dept: CT IMAGING | Facility: HOSPITAL | Age: 63
End: 2018-06-20
Attending: INTERNAL MEDICINE

## 2018-06-20 ENCOUNTER — TRANSCRIBE ORDERS (OUTPATIENT)
Dept: ADMINISTRATIVE | Facility: HOSPITAL | Age: 63
End: 2018-06-20

## 2018-06-20 DIAGNOSIS — R06.00 DYSPNEA, UNSPECIFIED TYPE: ICD-10-CM

## 2018-06-20 DIAGNOSIS — R93.89 ABNORMAL CT OF THE CHEST: Primary | ICD-10-CM

## 2018-06-20 DIAGNOSIS — R93.89 ABNORMAL CT OF THE CHEST: ICD-10-CM

## 2018-06-20 LAB — CREAT BLDA-MCNC: 1.1 MG/DL (ref 0.6–1.3)

## 2018-06-20 PROCEDURE — 71275 CT ANGIOGRAPHY CHEST: CPT

## 2018-06-20 PROCEDURE — 82565 ASSAY OF CREATININE: CPT

## 2018-06-20 PROCEDURE — 0 IOPAMIDOL PER 1 ML: Performed by: INTERNAL MEDICINE

## 2018-06-20 RX ADMIN — IOPAMIDOL 95 ML: 755 INJECTION, SOLUTION INTRAVENOUS at 15:10

## 2018-07-06 ENCOUNTER — TRANSCRIBE ORDERS (OUTPATIENT)
Dept: ADMINISTRATIVE | Facility: HOSPITAL | Age: 63
End: 2018-07-06

## 2018-07-06 DIAGNOSIS — I77.9 AORTA DISORDER (HCC): Primary | ICD-10-CM

## 2018-07-11 ENCOUNTER — APPOINTMENT (OUTPATIENT)
Dept: CARDIOLOGY | Facility: HOSPITAL | Age: 63
End: 2018-07-11
Attending: INTERNAL MEDICINE

## 2018-07-17 ENCOUNTER — HOSPITAL ENCOUNTER (OUTPATIENT)
Dept: CARDIOLOGY | Facility: HOSPITAL | Age: 63
Discharge: HOME | End: 2018-07-17
Attending: INTERNAL MEDICINE | Admitting: INTERNAL MEDICINE

## 2018-07-17 VITALS
BODY MASS INDEX: 28.35 KG/M2 | WEIGHT: 198 LBS | SYSTOLIC BLOOD PRESSURE: 128 MMHG | HEIGHT: 70 IN | DIASTOLIC BLOOD PRESSURE: 80 MMHG | HEART RATE: 63 BPM

## 2018-07-17 DIAGNOSIS — I77.9 AORTA DISORDER (HCC): ICD-10-CM

## 2018-07-17 LAB
AORTIC DIMENSIONLESS INDEX: 0.4 (DI)
ASCENDING AORTA: 4.2 CM
BH CV ECHO MEAS - ACS: 0.86 CM
BH CV ECHO MEAS - AO MAX PG (FULL): 26.2 MMHG
BH CV ECHO MEAS - AO MAX PG: 29.8 MMHG
BH CV ECHO MEAS - AO MEAN PG (FULL): 15.8 MMHG
BH CV ECHO MEAS - AO MEAN PG: 18.1 MMHG
BH CV ECHO MEAS - AO ROOT AREA (BSA CORRECTED): 1.5
BH CV ECHO MEAS - AO ROOT AREA: 7.9 CM^2
BH CV ECHO MEAS - AO ROOT DIAM: 3.2 CM
BH CV ECHO MEAS - AO V2 MAX: 272.8 CM/SEC
BH CV ECHO MEAS - AO V2 MEAN: 201.5 CM/SEC
BH CV ECHO MEAS - AO V2 VTI: 62.4 CM
BH CV ECHO MEAS - AVA(I,A): 0.97 CM^2
BH CV ECHO MEAS - AVA(I,D): 0.97 CM^2
BH CV ECHO MEAS - AVA(V,A): 0.94 CM^2
BH CV ECHO MEAS - AVA(V,D): 0.94 CM^2
BH CV ECHO MEAS - BSA(HAYCOCK): 2.1 M^2
BH CV ECHO MEAS - BSA: 2.1 M^2
BH CV ECHO MEAS - BZI_BMI: 28.4 KILOGRAMS/M^2
BH CV ECHO MEAS - BZI_METRIC_HEIGHT: 177.8 CM
BH CV ECHO MEAS - BZI_METRIC_WEIGHT: 89.8 KG
BH CV ECHO MEAS - CONTRAST EF (2CH): 60 ML/M^2
BH CV ECHO MEAS - CONTRAST EF 4CH: 60.3 ML/M^2
BH CV ECHO MEAS - EDV(MOD-SP2): 65 ML
BH CV ECHO MEAS - EDV(MOD-SP4): 73 ML
BH CV ECHO MEAS - EDV(TEICH): 90.3 ML
BH CV ECHO MEAS - EF(CUBED): 69.6 %
BH CV ECHO MEAS - EF(MOD-BP): 60 %
BH CV ECHO MEAS - EF(MOD-SP2): 60 %
BH CV ECHO MEAS - EF(MOD-SP4): 60.3 %
BH CV ECHO MEAS - EF(TEICH): 61.4 %
BH CV ECHO MEAS - ESV(MOD-SP2): 26 ML
BH CV ECHO MEAS - ESV(MOD-SP4): 29 ML
BH CV ECHO MEAS - ESV(TEICH): 34.9 ML
BH CV ECHO MEAS - FS: 32.8 %
BH CV ECHO MEAS - IVS/LVPW: 1
BH CV ECHO MEAS - IVSD: 1.2 CM
BH CV ECHO MEAS - LAT PEAK E' VEL: 9 CM/SEC
BH CV ECHO MEAS - LV DIASTOLIC VOL/BSA (35-75): 35.1 ML/M^2
BH CV ECHO MEAS - LV MASS(C)D: 197.4 GRAMS
BH CV ECHO MEAS - LV MASS(C)DI: 95 GRAMS/M^2
BH CV ECHO MEAS - LV MAX PG: 3.5 MMHG
BH CV ECHO MEAS - LV MEAN PG: 2.3 MMHG
BH CV ECHO MEAS - LV SYSTOLIC VOL/BSA (12-30): 14 ML/M^2
BH CV ECHO MEAS - LV V1 MAX: 94.1 CM/SEC
BH CV ECHO MEAS - LV V1 MEAN: 74 CM/SEC
BH CV ECHO MEAS - LV V1 VTI: 22.4 CM
BH CV ECHO MEAS - LVIDD: 4.5 CM
BH CV ECHO MEAS - LVIDS: 3 CM
BH CV ECHO MEAS - LVLD AP2: 8 CM
BH CV ECHO MEAS - LVLD AP4: 8.3 CM
BH CV ECHO MEAS - LVLS AP2: 7.3 CM
BH CV ECHO MEAS - LVLS AP4: 7 CM
BH CV ECHO MEAS - LVOT AREA (M): 2.8 CM^2
BH CV ECHO MEAS - LVOT AREA: 2.7 CM^2
BH CV ECHO MEAS - LVOT DIAM: 1.9 CM
BH CV ECHO MEAS - LVPWD: 1.2 CM
BH CV ECHO MEAS - MED PEAK E' VEL: 5 CM/SEC
BH CV ECHO MEAS - MV A DUR: 0.11 SEC
BH CV ECHO MEAS - MV A MAX VEL: 57.7 CM/SEC
BH CV ECHO MEAS - MV DEC SLOPE: 198 CM/SEC^2
BH CV ECHO MEAS - MV DEC TIME: 0.29 SEC
BH CV ECHO MEAS - MV E MAX VEL: 56.8 CM/SEC
BH CV ECHO MEAS - MV E/A: 0.98
BH CV ECHO MEAS - MV MAX PG: 2.6 MMHG
BH CV ECHO MEAS - MV MEAN PG: 1.1 MMHG
BH CV ECHO MEAS - MV P1/2T MAX VEL: 58.2 CM/SEC
BH CV ECHO MEAS - MV P1/2T: 86.1 MSEC
BH CV ECHO MEAS - MV V2 MAX: 80.4 CM/SEC
BH CV ECHO MEAS - MV V2 MEAN: 49.5 CM/SEC
BH CV ECHO MEAS - MV V2 VTI: 23.9 CM
BH CV ECHO MEAS - MVA P1/2T LCG: 3.8 CM^2
BH CV ECHO MEAS - MVA(P1/2T): 2.6 CM^2
BH CV ECHO MEAS - MVA(VTI): 2.5 CM^2
BH CV ECHO MEAS - PA MAX PG (FULL): 4.5 MMHG
BH CV ECHO MEAS - PA MAX PG: 6.7 MMHG
BH CV ECHO MEAS - PA V2 MAX: 129.2 CM/SEC
BH CV ECHO MEAS - PULM A REVS DUR: 0.1 SEC
BH CV ECHO MEAS - PULM A REVS VEL: 38.6 CM/SEC
BH CV ECHO MEAS - PULM DIAS VEL: 25.7 CM/SEC
BH CV ECHO MEAS - PULM S/D: 1.9
BH CV ECHO MEAS - PULM SYS VEL: 49.9 CM/SEC
BH CV ECHO MEAS - PVA(V,A): 2.4 CM^2
BH CV ECHO MEAS - PVA(V,D): 2.4 CM^2
BH CV ECHO MEAS - QP/QS: 1.1
BH CV ECHO MEAS - RAP SYSTOLE: 3 MMHG
BH CV ECHO MEAS - RV MAX PG: 2.2 MMHG
BH CV ECHO MEAS - RV MEAN PG: 1.3 MMHG
BH CV ECHO MEAS - RV V1 MAX: 74.2 CM/SEC
BH CV ECHO MEAS - RV V1 MEAN: 53.8 CM/SEC
BH CV ECHO MEAS - RV V1 VTI: 15.5 CM
BH CV ECHO MEAS - RVOT AREA: 4.2 CM^2
BH CV ECHO MEAS - RVOT DIAM: 2.3 CM
BH CV ECHO MEAS - RVSP: 19 MMHG
BH CV ECHO MEAS - SI(AO): 236.4 ML/M^2
BH CV ECHO MEAS - SI(CUBED): 29.6 ML/M^2
BH CV ECHO MEAS - SI(LVOT): 29.3 ML/M^2
BH CV ECHO MEAS - SI(MOD-SP2): 18.8 ML/M^2
BH CV ECHO MEAS - SI(MOD-SP4): 21.2 ML/M^2
BH CV ECHO MEAS - SI(TEICH): 26.7 ML/M^2
BH CV ECHO MEAS - SUP REN AO DIAM: 1.5 CM
BH CV ECHO MEAS - SV(AO): 491.3 ML
BH CV ECHO MEAS - SV(CUBED): 61.6 ML
BH CV ECHO MEAS - SV(LVOT): 60.9 ML
BH CV ECHO MEAS - SV(MOD-SP2): 39 ML
BH CV ECHO MEAS - SV(MOD-SP4): 44 ML
BH CV ECHO MEAS - SV(RVOT): 65.3 ML
BH CV ECHO MEAS - SV(TEICH): 55.4 ML
BH CV ECHO MEAS - TAPSE (>1.6): 2.1 CM2
BH CV ECHO MEAS - TR MAX VEL: 198.7 CM/SEC
BH CV ECHO MEASUREMENTS AVERAGE E/E' RATIO: 8.11
BH CV XLRA - RV BASE: 3.2 CM
BH CV XLRA - TDI S': 11 CM/SEC
LEFT ATRIUM VOLUME INDEX: 13 ML/M2
LEFT ATRIUM VOLUME: 28 CM3
MAXIMAL PREDICTED HEART RATE: 155 BPM
SINUS: 3.3 CM
STJ: 3.9 CM
STRESS TARGET HR: 132 BPM

## 2018-07-17 PROCEDURE — 93306 TTE W/DOPPLER COMPLETE: CPT

## 2018-07-17 PROCEDURE — 93306 TTE W/DOPPLER COMPLETE: CPT | Performed by: INTERNAL MEDICINE

## 2018-07-18 ENCOUNTER — TELEPHONE (OUTPATIENT)
Dept: FAMILY MEDICINE CLINIC | Facility: CLINIC | Age: 63
End: 2018-07-18

## 2018-07-18 RX ORDER — HYDROCODONE BITARTRATE AND ACETAMINOPHEN 10; 325 MG/1; MG/1
1 TABLET ORAL
Qty: 150 TABLET | Refills: 0 | Status: SHIPPED | OUTPATIENT
Start: 2018-07-18 | End: 2018-08-14 | Stop reason: SDUPTHER

## 2018-07-18 NOTE — TELEPHONE ENCOUNTER
CALLED PT LM AND LET PT KNOW PRESCRIPTION WAS READY FOR  AND ADVISED HE WAS DUE FOR APPT ON NEXT  AS WELL. PT WILL CALL BACK IF HAS ANY QUESTIONS.   ----- Message from Joanna Gonzalez sent at 7/17/2018  2:42 PM EDT -----  NEEDS A RX FOR VICODIN 10/325 MG # 150     PLEASE CALL WHEN READY TO [facility]     [phone]

## 2018-07-19 NOTE — PROGRESS NOTES
"Date of Office Visit: 2018  Encounter Provider: Antonio Baez MD  Place of Service: [facility]  Patient Name: Octavio Espinoza  :1955    Chief Complaint   Patient presents with   • Shortness of Breath     w/ exertion   • Aortic Stenosis   :     HPI: Octavio Espinoza is a 63 y.o. male who presents today at the request of Dr. Gallo regarding a dilated aorta and abnormal aortic valve.    He has been a very healthy man.  He is a respiratory therapy.  He is quite muscular and loves to exercise.  He was referred to pulmonology because of a chronic cough.  A non-contrasted chest CT reported a possible PE, and a 4.4cm ascending aorta.  A CTA showed that it was actually 4.6cm, and there was no PE.    An echo was performed, and I personally reviewed.  I agree with almost all of it, except that I think that the aortic valve is bicuspid.  There is mild-moderate AS.  There is normal LV systolic and diastolic function.     He denies any family history of aneurysm, CAD, valvular disease, or CHF.  He reports that he has dyspnea, but with further questioning, it is extremely mild.  He can swim for a solid hour and has no problems, but if he \"really goes at it\" then he notices some shortness of breath that is mild.  He denies chest pain, palpitations, or exertional lightheadedness.     Past Medical History:   Diagnosis Date   • ADHD (attention deficit hyperactivity disorder)    • Anxiety    • Asthma    • Back pain    • Chronic cough    • Chronic rhinitis    • Colon polyp    • DDD (degenerative disc disease), cervical    • Depression    • Erectile dysfunction    • History of colon polyps        Past Surgical History:   Procedure Laterality Date   • COLONOSCOPY  2015    Wade Rome MD       Social History     Social History   • Marital status:      Spouse name: N/A   • Number of children: N/A   • Years of education: Postgraduate     Occupational History   •  Retired "     Social History Main Topics   • Smoking status: Former Smoker     Quit date: 1980   • Smokeless tobacco: Never Used      Comment: caffeine use: 4 cups daily.   • Alcohol use No      Comment: Rare    • Drug use: No   • Sexual activity: Not on file     Other Topics Concern   • Not on file     Social History Narrative   • No narrative on file       Family History   Problem Relation Age of Onset   • Cancer Other    • Dementia Other    • Squamous cell carcinoma Mother    • Lung cancer Mother 44   • Emphysema Father        Review of Systems   Cardiovascular: Positive for dyspnea on exertion.   Respiratory: Positive for cough.    Musculoskeletal: Positive for joint pain.   All other systems reviewed and are negative.      No Known Allergies      Current Outpatient Prescriptions:   •  albuterol (PROVENTIL HFA;VENTOLIN HFA) 108 (90 BASE) MCG/ACT inhaler, Inhale 2 puffs every 4 (four) hours as needed for wheezing., Disp: 1 inhaler, Rfl: 11  •  buPROPion SR (WELLBUTRIN SR) 150 MG 12 hr tablet, Take 1 tablet by mouth 2 (Two) Times a Day., Disp: 180 tablet, Rfl: 3  •  dicyclomine (BENTYL) 10 MG capsule, TAKE 1 CAPSULE BY MOUTH 4 TIMES DAILY BEFORE MEALS AND AT BEDTIME, Disp: 120 capsule, Rfl: 1  •  finasteride (PROSCAR) 5 MG tablet, Take 1 tablet by mouth Daily., Disp: 90 tablet, Rfl: 3  •  FLUARIX QUADRIVALENT 0.5 ML suspension prefilled syringe injection, , Disp: , Rfl:   •  fluticasone (FLOVENT HFA) 110 MCG/ACT inhaler, Inhale 1 puff 2 (Two) Times a Day., Disp: 12 g, Rfl: 5  •  gabapentin (NEURONTIN) 600 MG tablet, Take 1 tablet by mouth 4 (Four) Times a Day., Disp: 360 tablet, Rfl: 3  •  HYDROcodone-acetaminophen (NORCO)  MG per tablet, Take 1 tablet by mouth Every 3 (Three) Hours., Disp: 150 tablet, Rfl: 0  •  methylphenidate (RITALIN) 20 MG tablet, Take 1 tablet by mouth Every 12 (Twelve) Hours., Disp: 60 tablet, Rfl: 0  •  Multiple Vitamins-Minerals (MULTIVITAMIN ADULT PO), Take  by mouth Daily., Disp: , Rfl:   •  " Omega-3 Fatty Acids (FISH OIL PO), Take  by mouth Daily., Disp: , Rfl:   •  terazosin (HYTRIN) 10 MG capsule, Take 1 capsule by mouth Every Night., Disp: 90 capsule, Rfl: 3      Objective:     Vitals:    07/20/18 1143   BP: 136/84   BP Location: Right arm   Pulse: 61   Weight: 88.2 kg (194 lb 6.4 oz)   Height: 177.8 cm (70\")     Body mass index is 27.89 kg/m².    Physical Exam   Constitutional: He is oriented to person, place, and time. He appears well-developed and well-nourished.   HENT:   Head: Normocephalic.   Nose: Nose normal.   Mouth/Throat: Oropharynx is clear and moist.   Eyes: Pupils are equal, round, and reactive to light. Conjunctivae and EOM are normal.   Neck: Normal range of motion. No JVD present.   Cardiovascular: Normal rate, regular rhythm and intact distal pulses.    Murmur heard.   Harsh systolic murmur is present with a grade of 2/6  at the upper right sternal border  Pulmonary/Chest: Effort normal and breath sounds normal.   Abdominal: Soft. He exhibits no mass. There is no tenderness.   Musculoskeletal: Normal range of motion. He exhibits no edema.   Lymphadenopathy:     He has no cervical adenopathy.   Neurological: He is alert and oriented to person, place, and time. No cranial nerve deficit.   Skin: Skin is warm and dry. No rash noted.   Psychiatric: He has a normal mood and affect. His behavior is normal. Judgment and thought content normal.   Vitals reviewed.        ECG 12 Lead  Date/Time: 7/20/2018 1:57 PM  Performed by: JUAN GORDILLO  Authorized by: JUAN GORDILLO   Comparison: compared with previous ECG   Similar to previous ECG  Rhythm: sinus rhythm  Conduction: conduction normal  ST Segments: ST segments normal  T Waves: T waves normal  Other: no other findings  Clinical impression: normal ECG              Assessment:       Diagnosis Plan   1. Nonrheumatic aortic valve stenosis  Adult Transthoracic Echo Complete W/ Cont if Necessary Per Protocol   2. Ascending aorta dilation " (CMS/HCC)  CT Angiogram Chest With & Without Contrast   3. Dyspnea on exertion  Treadmill Stress Test          Plan:       Mr. Espinoza has a probable bicuspid valve with mild-moderate AS.  His heart is otherwise normal.  He has mild dilation of the ascending aorta.  We need to follow this as BAV patients have an increased risk of TAA/dissection due to cystic medial necrosis.  I will repeat his CTA six months after the last one, and will repeat his echo in one year.      His dyspnea is extremely mild.  He can swim for AN HOUR without any problem.  I think this is physiologic; I did put him on the treadmill just to be sure though, and his stress test was completely normal.     Sincerely,       Antonio Baez MD

## 2018-07-20 ENCOUNTER — OFFICE VISIT (OUTPATIENT)
Dept: CARDIOLOGY | Facility: CLINIC | Age: 63
End: 2018-07-20

## 2018-07-20 ENCOUNTER — HOSPITAL ENCOUNTER (OUTPATIENT)
Dept: CARDIOLOGY | Facility: HOSPITAL | Age: 63
Discharge: HOME | End: 2018-07-20
Attending: INTERNAL MEDICINE | Admitting: INTERNAL MEDICINE

## 2018-07-20 VITALS
BODY MASS INDEX: 27.83 KG/M2 | HEIGHT: 70 IN | DIASTOLIC BLOOD PRESSURE: 84 MMHG | HEART RATE: 61 BPM | SYSTOLIC BLOOD PRESSURE: 136 MMHG | WEIGHT: 194.4 LBS

## 2018-07-20 DIAGNOSIS — R06.09 DYSPNEA ON EXERTION: ICD-10-CM

## 2018-07-20 DIAGNOSIS — I35.0 NONRHEUMATIC AORTIC VALVE STENOSIS: Primary | ICD-10-CM

## 2018-07-20 DIAGNOSIS — I77.810 ASCENDING AORTA DILATION (HCC): ICD-10-CM

## 2018-07-20 PROBLEM — J01.90 ACUTE SINUSITIS: Status: RESOLVED | Noted: 2017-01-31 | Resolved: 2018-07-20

## 2018-07-20 PROBLEM — R19.4 CHANGE IN BOWEL HABITS: Status: RESOLVED | Noted: 2017-06-30 | Resolved: 2018-07-20

## 2018-07-20 LAB
BH CV STRESS BP STAGE 1: NORMAL
BH CV STRESS BP STAGE 2: NORMAL
BH CV STRESS BP STAGE 3: NORMAL
BH CV STRESS DURATION MIN STAGE 1: 3
BH CV STRESS DURATION MIN STAGE 2: 3
BH CV STRESS DURATION MIN STAGE 3: 3
BH CV STRESS DURATION SEC STAGE 1: 0
BH CV STRESS DURATION SEC STAGE 2: 0
BH CV STRESS DURATION SEC STAGE 3: 0
BH CV STRESS GRADE STAGE 1: 10
BH CV STRESS GRADE STAGE 2: 12
BH CV STRESS GRADE STAGE 3: 14
BH CV STRESS HR STAGE 1: 91
BH CV STRESS HR STAGE 2: 124
BH CV STRESS HR STAGE 3: 151
BH CV STRESS METS STAGE 1: 5
BH CV STRESS METS STAGE 2: 7.5
BH CV STRESS METS STAGE 3: 10
BH CV STRESS PROTOCOL 1: NORMAL
BH CV STRESS RECOVERY BP: NORMAL MMHG
BH CV STRESS RECOVERY HR: 94 BPM
BH CV STRESS SPEED STAGE 1: 1.7
BH CV STRESS SPEED STAGE 2: 2.5
BH CV STRESS SPEED STAGE 3: 3.4
BH CV STRESS STAGE 1: 1
BH CV STRESS STAGE 2: 2
BH CV STRESS STAGE 3: 3
MAXIMAL PREDICTED HEART RATE: 155 BPM
PERCENT MAX PREDICTED HR: 97.42 %
STRESS BASELINE BP: NORMAL MMHG
STRESS BASELINE HR: 75 BPM
STRESS PERCENT HR: 115 %
STRESS POST ESTIMATED WORKLOAD: 10 METS
STRESS POST EXERCISE DUR MIN: 9 MIN
STRESS POST EXERCISE DUR SEC: 0 SEC
STRESS POST PEAK BP: NORMAL MMHG
STRESS POST PEAK HR: 151 BPM
STRESS TARGET HR: 132 BPM

## 2018-07-20 PROCEDURE — 93000 ELECTROCARDIOGRAM COMPLETE: CPT | Performed by: INTERNAL MEDICINE

## 2018-07-20 PROCEDURE — 99204 OFFICE O/P NEW MOD 45 MIN: CPT | Performed by: INTERNAL MEDICINE

## 2018-07-20 PROCEDURE — 93016 CV STRESS TEST SUPVJ ONLY: CPT | Performed by: INTERNAL MEDICINE

## 2018-07-20 PROCEDURE — 93017 CV STRESS TEST TRACING ONLY: CPT

## 2018-07-20 PROCEDURE — 93018 CV STRESS TEST I&R ONLY: CPT | Performed by: INTERNAL MEDICINE

## 2018-08-08 RX ORDER — GABAPENTIN 600 MG/1
TABLET ORAL
Qty: 360 TABLET | Refills: 1 | Status: SHIPPED | OUTPATIENT
Start: 2018-08-08

## 2018-08-09 ENCOUNTER — TELEPHONE (OUTPATIENT)
Dept: FAMILY MEDICINE CLINIC | Facility: CLINIC | Age: 63
End: 2018-08-09

## 2018-08-15 ENCOUNTER — TELEPHONE (OUTPATIENT)
Dept: FAMILY MEDICINE CLINIC | Facility: CLINIC | Age: 63
End: 2018-08-15

## 2018-08-15 RX ORDER — HYDROCODONE BITARTRATE AND ACETAMINOPHEN 10; 325 MG/1; MG/1
1 TABLET ORAL
Qty: 150 TABLET | Refills: 0 | Status: SHIPPED | OUTPATIENT
Start: 2018-08-15 | End: 2018-09-17 | Stop reason: SDUPTHER

## 2018-08-15 NOTE — TELEPHONE ENCOUNTER
CALLED PT LM ADVISING HIM PRESCRIPTION WAS READY.   ----- Message from Nadine Mills MA sent at 8/14/2018  3:28 PM EDT -----  Contact: PATIENT  PATIENT IS NEEDING REFILL ON HYDROCODONE  SAYS IT IS DUE ON 8/18/18  SCHEDULED FOR APPT WITH DR. WADSWORTH ON 8/29/18    #[phone]

## 2018-08-29 ENCOUNTER — OFFICE VISIT (OUTPATIENT)
Dept: FAMILY MEDICINE CLINIC | Facility: CLINIC | Age: 63
End: 2018-08-29

## 2018-08-29 VITALS
TEMPERATURE: 98.1 F | HEIGHT: 70 IN | BODY MASS INDEX: 27.75 KG/M2 | WEIGHT: 193.8 LBS | OXYGEN SATURATION: 95 % | RESPIRATION RATE: 16 BRPM | HEART RATE: 66 BPM | DIASTOLIC BLOOD PRESSURE: 66 MMHG | SYSTOLIC BLOOD PRESSURE: 90 MMHG

## 2018-08-29 DIAGNOSIS — M48.02 DEGENERATIVE CERVICAL SPINAL STENOSIS: ICD-10-CM

## 2018-08-29 DIAGNOSIS — G89.29 CHRONIC LOW BACK PAIN WITHOUT SCIATICA, UNSPECIFIED BACK PAIN LATERALITY: ICD-10-CM

## 2018-08-29 DIAGNOSIS — R41.840 ATTENTION OR CONCENTRATION DEFICIT: Primary | ICD-10-CM

## 2018-08-29 DIAGNOSIS — M54.50 CHRONIC LOW BACK PAIN WITHOUT SCIATICA, UNSPECIFIED BACK PAIN LATERALITY: ICD-10-CM

## 2018-08-29 PROCEDURE — 99213 OFFICE O/P EST LOW 20 MIN: CPT | Performed by: INTERNAL MEDICINE

## 2018-08-29 RX ORDER — FLUTICASONE PROPIONATE 50 MCG
SPRAY, SUSPENSION (ML) NASAL
Refills: 2 | COMMUNITY
Start: 2018-08-11

## 2018-08-29 RX ORDER — METHYLPHENIDATE HYDROCHLORIDE 20 MG/1
20 TABLET ORAL EVERY 12 HOURS SCHEDULED
Qty: 60 TABLET | Refills: 0 | Status: SHIPPED | OUTPATIENT
Start: 2018-08-29 | End: 2018-11-08 | Stop reason: SDUPTHER

## 2018-09-16 NOTE — PROGRESS NOTES
Subjective   Octavio Espinoza is a 63 y.o. male. Patient is here today for   Chief Complaint   Patient presents with   • ADD     refill ritalin           Vitals:    08/29/18 1445   BP: 90/66   Pulse: 66   Resp: 16   Temp: 98.1 °F (36.7 °C)   SpO2: 95%       Past Medical History:   Diagnosis Date   • ADHD (attention deficit hyperactivity disorder)    • Anxiety    • Asthma    • Back pain    • Chronic cough    • Chronic rhinitis    • Colon polyp    • DDD (degenerative disc disease), cervical    • Depression    • Erectile dysfunction    • History of colon polyps       No Known Allergies   Social History     Social History   • Marital status:      Spouse name: N/A   • Number of children: N/A   • Years of education: Postgraduate     Occupational History   •  Retired     Social History Main Topics   • Smoking status: Former Smoker     Quit date: 1980   • Smokeless tobacco: Never Used      Comment: caffeine use: 4 cups daily.   • Alcohol use No      Comment: Rare    • Drug use: No   • Sexual activity: Not on file     Other Topics Concern   • Not on file     Social History Narrative   • No narrative on file        Current Outpatient Prescriptions:   •  albuterol (PROVENTIL HFA;VENTOLIN HFA) 108 (90 BASE) MCG/ACT inhaler, Inhale 2 puffs every 4 (four) hours as needed for wheezing., Disp: 1 inhaler, Rfl: 11  •  buPROPion SR (WELLBUTRIN SR) 150 MG 12 hr tablet, Take 1 tablet by mouth 2 (Two) Times a Day., Disp: 180 tablet, Rfl: 3  •  dicyclomine (BENTYL) 10 MG capsule, TAKE 1 CAPSULE BY MOUTH 4 TIMES DAILY BEFORE MEALS AND AT BEDTIME, Disp: 120 capsule, Rfl: 1  •  finasteride (PROSCAR) 5 MG tablet, Take 1 tablet by mouth Daily., Disp: 90 tablet, Rfl: 3  •  fluticasone (FLONASE) 50 MCG/ACT nasal spray, SPR ONCE IEN BID, Disp: , Rfl: 2  •  fluticasone (FLOVENT HFA) 110 MCG/ACT inhaler, Inhale 1 puff 2 (Two) Times a Day., Disp: 12 g, Rfl: 5  •  gabapentin (NEURONTIN) 600 MG tablet, TAKE ONE TABLET BY MOUTH 4 TIMES DAILY,  Disp: 360 tablet, Rfl: 1  •  HYDROcodone-acetaminophen (NORCO)  MG per tablet, Take 1 tablet by mouth Every 3 (Three) Hours., Disp: 150 tablet, Rfl: 0  •  methylphenidate (RITALIN) 20 MG tablet, Take 1 tablet by mouth Every 12 (Twelve) Hours., Disp: 60 tablet, Rfl: 0  •  Multiple Vitamins-Minerals (MULTIVITAMIN ADULT PO), Take  by mouth Daily., Disp: , Rfl:   •  Omega-3 Fatty Acids (FISH OIL PO), Take  by mouth Daily., Disp: , Rfl:   •  terazosin (HYTRIN) 10 MG capsule, Take 1 capsule by mouth Every Night., Disp: 90 capsule, Rfl: 3  •  FLUARIX QUADRIVALENT 0.5 ML suspension prefilled syringe injection, , Disp: , Rfl:      Objective     His chronic lumbar spine pain.  He takes hydrocodone occasionally for his lumbar spine pain and requested refill on that medication today.    He also feels his ADD is well-controlled on Ritalin requested refill on that today.      ADD          Review of Systems   Constitutional: Negative.    HENT: Negative.    Respiratory: Negative.    Cardiovascular: Negative.    Musculoskeletal:        His chronic lumbar spine pain which she feels is relatively well-controlled and hydrocodone.   Psychiatric/Behavioral: Negative.         He feels his ADD is well-controlled on Ritalin.       Physical Exam   Constitutional: He is oriented to person, place, and time. He appears well-developed and well-nourished.   HENT:   Head: Normocephalic and atraumatic.   Pulmonary/Chest: Effort normal.   Neurological: He is alert and oriented to person, place, and time.   Psychiatric: He has a normal mood and affect. His behavior is normal.   Nursing note and vitals reviewed.        Problem List Items Addressed This Visit        Nervous and Auditory    Low back pain       Other    Degenerative cervical spinal stenosis    Attention or concentration deficit - Primary            PLAN  I refilled his hydrocodone, and Ritalin.    Follow-up for a Medicare wellness visit once yearly.    I like to see him back in 3  months.  No Follow-up on file.

## 2018-09-18 ENCOUNTER — TELEPHONE (OUTPATIENT)
Dept: FAMILY MEDICINE CLINIC | Facility: CLINIC | Age: 63
End: 2018-09-18

## 2018-09-18 RX ORDER — HYDROCODONE BITARTRATE AND ACETAMINOPHEN 10; 325 MG/1; MG/1
1 TABLET ORAL
Qty: 150 TABLET | Refills: 0 | Status: SHIPPED | OUTPATIENT
Start: 2018-09-18 | End: 2018-10-15 | Stop reason: SDUPTHER

## 2018-09-18 NOTE — TELEPHONE ENCOUNTER
CALLED PT LET PT KNOW PRESCRIPTION WAS READY FOR  AT OUR OFFICE. PT UNDERSTOOD.   ----- Message from Joanna Gonzalez sent at 9/17/2018  1:08 PM EDT -----  NEEDS RX FOR HYDROCODONE 10/325 # 150     PLEASE CALL WHEN READY TO      [phone]

## 2018-10-15 RX ORDER — HYDROCODONE BITARTRATE AND ACETAMINOPHEN 10; 325 MG/1; MG/1
1 TABLET ORAL
Qty: 150 TABLET | Refills: 0 | Status: SHIPPED | OUTPATIENT
Start: 2018-10-15 | End: 2018-11-08 | Stop reason: SDUPTHER

## 2018-10-17 ENCOUNTER — TELEPHONE (OUTPATIENT)
Dept: FAMILY MEDICINE CLINIC | Facility: CLINIC | Age: 63
End: 2018-10-17

## 2018-10-17 NOTE — TELEPHONE ENCOUNTER
CALLED PT AND ADVISED HIM PRESCRIPTION WAS READY FOR . PT UNDERSTOOD.   ----- Message from Afshan Berumen sent at 10/15/2018 11:23 AM EDT -----  REFILL ON:    HYDROCODONE 10/325MG QTY: 150    PLEASE CALL PT WHEN READY: [phone]

## 2018-11-08 ENCOUNTER — OFFICE VISIT (OUTPATIENT)
Dept: FAMILY MEDICINE CLINIC | Facility: CLINIC | Age: 63
End: 2018-11-08

## 2018-11-08 VITALS
HEIGHT: 70 IN | DIASTOLIC BLOOD PRESSURE: 78 MMHG | HEART RATE: 78 BPM | SYSTOLIC BLOOD PRESSURE: 120 MMHG | WEIGHT: 194.8 LBS | BODY MASS INDEX: 27.89 KG/M2 | RESPIRATION RATE: 16 BRPM | OXYGEN SATURATION: 98 %

## 2018-11-08 DIAGNOSIS — M54.50 CHRONIC LOW BACK PAIN WITHOUT SCIATICA, UNSPECIFIED BACK PAIN LATERALITY: Primary | ICD-10-CM

## 2018-11-08 DIAGNOSIS — G89.29 CHRONIC LOW BACK PAIN WITHOUT SCIATICA, UNSPECIFIED BACK PAIN LATERALITY: Primary | ICD-10-CM

## 2018-11-08 DIAGNOSIS — R41.840 ATTENTION OR CONCENTRATION DEFICIT: ICD-10-CM

## 2018-11-08 PROCEDURE — 99213 OFFICE O/P EST LOW 20 MIN: CPT | Performed by: INTERNAL MEDICINE

## 2018-11-08 RX ORDER — METHYLPHENIDATE HYDROCHLORIDE 20 MG/1
20 TABLET ORAL EVERY 12 HOURS SCHEDULED
Qty: 60 TABLET | Refills: 0 | Status: SHIPPED | OUTPATIENT
Start: 2018-11-08

## 2018-11-08 RX ORDER — HYDROCODONE BITARTRATE AND ACETAMINOPHEN 10; 325 MG/1; MG/1
1 TABLET ORAL
Qty: 150 TABLET | Refills: 0 | Status: SHIPPED | OUTPATIENT
Start: 2018-11-08 | End: 2018-12-06 | Stop reason: SDUPTHER

## 2018-11-11 NOTE — PROGRESS NOTES
Subjective   Octavio Espinoza is a 63 y.o. male. Patient is here today for   Chief Complaint   Patient presents with   • Pain     refill hydrocodone           Vitals:    18 1605   BP: 120/78   Pulse: 78   Resp: 16   SpO2: 98%       Past Medical History:   Diagnosis Date   • ADHD (attention deficit hyperactivity disorder)    • Anxiety    • Asthma    • Back pain    • Chronic cough    • Chronic rhinitis    • Colon polyp    • DDD (degenerative disc disease), cervical    • Depression    • Erectile dysfunction    • History of colon polyps       No Known Allergies   Social History     Socioeconomic History   • Marital status:      Spouse name: Not on file   • Number of children: Not on file   • Years of education: Postgraduate   • Highest education level: Not on file   Social Needs   • Financial resource strain: Not on file   • Food insecurity - worry: Not on file   • Food insecurity - inability: Not on file   • Transportation needs - medical: Not on file   • Transportation needs - non-medical: Not on file   Occupational History     Employer: RETIRED   Tobacco Use   • Smoking status: Former Smoker     Last attempt to quit: 1980     Years since quittin.8   • Smokeless tobacco: Never Used   • Tobacco comment: caffeine use: 4 cups daily.   Substance and Sexual Activity   • Alcohol use: No     Comment: Rare    • Drug use: No   • Sexual activity: Not on file   Other Topics Concern   • Not on file   Social History Narrative   • Not on file        Current Outpatient Medications:   •  albuterol (PROVENTIL HFA;VENTOLIN HFA) 108 (90 BASE) MCG/ACT inhaler, Inhale 2 puffs every 4 (four) hours as needed for wheezing., Disp: 1 inhaler, Rfl: 11  •  buPROPion SR (WELLBUTRIN SR) 150 MG 12 hr tablet, Take 1 tablet by mouth 2 (Two) Times a Day., Disp: 180 tablet, Rfl: 3  •  dicyclomine (BENTYL) 10 MG capsule, TAKE 1 CAPSULE BY MOUTH 4 TIMES DAILY BEFORE MEALS AND AT BEDTIME, Disp: 120 capsule, Rfl: 1  •  finasteride (PROSCAR)  5 MG tablet, Take 1 tablet by mouth Daily., Disp: 90 tablet, Rfl: 3  •  FLUARIX QUADRIVALENT 0.5 ML suspension prefilled syringe injection, , Disp: , Rfl:   •  fluticasone (FLONASE) 50 MCG/ACT nasal spray, SPR ONCE IEN BID, Disp: , Rfl: 2  •  fluticasone (FLOVENT HFA) 110 MCG/ACT inhaler, Inhale 1 puff 2 (Two) Times a Day., Disp: 12 g, Rfl: 5  •  gabapentin (NEURONTIN) 600 MG tablet, TAKE ONE TABLET BY MOUTH 4 TIMES DAILY, Disp: 360 tablet, Rfl: 1  •  HYDROcodone-acetaminophen (NORCO)  MG per tablet, Take 1 tablet by mouth Every 3 (Three) Hours., Disp: 150 tablet, Rfl: 0  •  methylphenidate (RITALIN) 20 MG tablet, Take 1 tablet by mouth Every 12 (Twelve) Hours., Disp: 60 tablet, Rfl: 0  •  Multiple Vitamins-Minerals (MULTIVITAMIN ADULT PO), Take  by mouth Daily., Disp: , Rfl:   •  Omega-3 Fatty Acids (FISH OIL PO), Take  by mouth Daily., Disp: , Rfl:   •  terazosin (HYTRIN) 10 MG capsule, Take 1 capsule by mouth Every Night., Disp: 90 capsule, Rfl: 3     Objective     He has chronic back pain.  He has ADD.  He requested the medications that he takes to manage these issues.    Feels that they are relatively well-controlled.      Pain          Review of Systems   Constitutional: Negative.    HENT: Negative.    Respiratory: Negative.    Cardiovascular: Negative.    Musculoskeletal: Negative.    Psychiatric/Behavioral: Negative.        Physical Exam   Constitutional: He is oriented to person, place, and time. He appears well-developed and well-nourished.   HENT:   Head: Normocephalic and atraumatic.   Pulmonary/Chest: Effort normal.   Neurological: He is alert and oriented to person, place, and time.   Psychiatric: He has a normal mood and affect. His behavior is normal.   Nursing note and vitals reviewed.        Problem List Items Addressed This Visit        Nervous and Auditory    Low back pain - Primary       Other    Attention or concentration deficit            PLAN  I refilled his controlled substances.  I  asked him to follow-up for a Medicare wellness visit once yearly.    I like to see him back in 3 months or so.  No Follow-up on file.

## 2018-12-06 ENCOUNTER — OFFICE VISIT (OUTPATIENT)
Dept: FAMILY MEDICINE CLINIC | Facility: CLINIC | Age: 63
End: 2018-12-06

## 2018-12-06 VITALS
HEIGHT: 70 IN | WEIGHT: 194 LBS | TEMPERATURE: 97.2 F | SYSTOLIC BLOOD PRESSURE: 112 MMHG | RESPIRATION RATE: 16 BRPM | HEART RATE: 72 BPM | BODY MASS INDEX: 27.77 KG/M2 | OXYGEN SATURATION: 96 % | DIASTOLIC BLOOD PRESSURE: 72 MMHG

## 2018-12-06 DIAGNOSIS — M54.50 CHRONIC LOW BACK PAIN WITHOUT SCIATICA, UNSPECIFIED BACK PAIN LATERALITY: ICD-10-CM

## 2018-12-06 DIAGNOSIS — M54.2 NECK PAIN: ICD-10-CM

## 2018-12-06 DIAGNOSIS — J20.9 ACUTE BRONCHITIS, UNSPECIFIED ORGANISM: Primary | ICD-10-CM

## 2018-12-06 DIAGNOSIS — G89.29 CHRONIC LOW BACK PAIN WITHOUT SCIATICA, UNSPECIFIED BACK PAIN LATERALITY: ICD-10-CM

## 2018-12-06 DIAGNOSIS — M50.30 DEGENERATION OF INTERVERTEBRAL DISC OF CERVICAL REGION: ICD-10-CM

## 2018-12-06 PROCEDURE — 99214 OFFICE O/P EST MOD 30 MIN: CPT | Performed by: INTERNAL MEDICINE

## 2018-12-06 RX ORDER — HYDROCODONE BITARTRATE AND ACETAMINOPHEN 10; 325 MG/1; MG/1
1 TABLET ORAL
Qty: 150 TABLET | Refills: 0 | Status: SHIPPED | OUTPATIENT
Start: 2018-12-06 | End: 2018-12-06 | Stop reason: SDUPTHER

## 2018-12-06 RX ORDER — HYDROCODONE BITARTRATE AND ACETAMINOPHEN 10; 325 MG/1; MG/1
1 TABLET ORAL
Qty: 150 TABLET | Refills: 0 | Status: SHIPPED | OUTPATIENT
Start: 2018-12-06

## 2018-12-06 RX ORDER — AZITHROMYCIN 250 MG/1
TABLET, FILM COATED ORAL
Qty: 6 TABLET | Refills: 0 | Status: SHIPPED | OUTPATIENT
Start: 2018-12-06 | End: 2018-12-07 | Stop reason: SDUPTHER

## 2018-12-07 ENCOUNTER — TELEPHONE (OUTPATIENT)
Dept: FAMILY MEDICINE CLINIC | Facility: CLINIC | Age: 63
End: 2018-12-07

## 2018-12-07 RX ORDER — AZITHROMYCIN 250 MG/1
TABLET, FILM COATED ORAL
Qty: 6 TABLET | Refills: 0 | Status: SHIPPED | OUTPATIENT
Start: 2018-12-07

## 2018-12-09 PROBLEM — J20.9 ACUTE BRONCHITIS: Status: ACTIVE | Noted: 2018-12-09

## 2018-12-09 NOTE — PROGRESS NOTES
Subjective   Octavio Espinoza is a 63 y.o. male. Patient is here today for   Chief Complaint   Patient presents with   • Cough     pt states its green and yellow           Vitals:    18 1306   BP: 112/72   Pulse: 72   Resp: 16   Temp: 97.2 °F (36.2 °C)   SpO2: 96%       Past Medical History:   Diagnosis Date   • ADHD (attention deficit hyperactivity disorder)    • Anxiety    • Asthma    • Back pain    • Chronic cough    • Chronic rhinitis    • Colon polyp    • DDD (degenerative disc disease), cervical    • Depression    • Erectile dysfunction    • History of colon polyps       No Known Allergies   Social History     Socioeconomic History   • Marital status:      Spouse name: Not on file   • Number of children: Not on file   • Years of education: Postgraduate   • Highest education level: Not on file   Social Needs   • Financial resource strain: Not on file   • Food insecurity - worry: Not on file   • Food insecurity - inability: Not on file   • Transportation needs - medical: Not on file   • Transportation needs - non-medical: Not on file   Occupational History     Employer: RETIRED   Tobacco Use   • Smoking status: Former Smoker     Last attempt to quit: 1980     Years since quittin.9   • Smokeless tobacco: Never Used   • Tobacco comment: caffeine use: 4 cups daily.   Substance and Sexual Activity   • Alcohol use: No     Comment: Rare    • Drug use: No   • Sexual activity: Not on file   Other Topics Concern   • Not on file   Social History Narrative   • Not on file        Current Outpatient Medications:   •  albuterol (PROVENTIL HFA;VENTOLIN HFA) 108 (90 BASE) MCG/ACT inhaler, Inhale 2 puffs every 4 (four) hours as needed for wheezing., Disp: 1 inhaler, Rfl: 11  •  buPROPion SR (WELLBUTRIN SR) 150 MG 12 hr tablet, Take 1 tablet by mouth 2 (Two) Times a Day., Disp: 180 tablet, Rfl: 3  •  dicyclomine (BENTYL) 10 MG capsule, TAKE 1 CAPSULE BY MOUTH 4 TIMES DAILY BEFORE MEALS AND AT BEDTIME, Disp: 120  capsule, Rfl: 1  •  finasteride (PROSCAR) 5 MG tablet, Take 1 tablet by mouth Daily., Disp: 90 tablet, Rfl: 3  •  FLUARIX QUADRIVALENT 0.5 ML suspension prefilled syringe injection, , Disp: , Rfl:   •  fluticasone (FLONASE) 50 MCG/ACT nasal spray, SPR ONCE IEN BID, Disp: , Rfl: 2  •  fluticasone (FLOVENT HFA) 110 MCG/ACT inhaler, Inhale 1 puff 2 (Two) Times a Day., Disp: 12 g, Rfl: 5  •  gabapentin (NEURONTIN) 600 MG tablet, TAKE ONE TABLET BY MOUTH 4 TIMES DAILY, Disp: 360 tablet, Rfl: 1  •  HYDROcodone-acetaminophen (NORCO)  MG per tablet, Take 1 tablet by mouth Every 3 (Three) Hours., Disp: 150 tablet, Rfl: 0  •  methylphenidate (RITALIN) 20 MG tablet, Take 1 tablet by mouth Every 12 (Twelve) Hours., Disp: 60 tablet, Rfl: 0  •  Multiple Vitamins-Minerals (MULTIVITAMIN ADULT PO), Take  by mouth Daily., Disp: , Rfl:   •  Omega-3 Fatty Acids (FISH OIL PO), Take  by mouth Daily., Disp: , Rfl:   •  terazosin (HYTRIN) 10 MG capsule, Take 1 capsule by mouth Every Night., Disp: 90 capsule, Rfl: 3  •  azithromycin (ZITHROMAX) 250 MG tablet, Take 2 tablets the first day, then 1 tablet daily for 4 days., Disp: 6 tablet, Rfl: 0     Objective     This patient has had a productive cough for the last 2 weeks.  He denies any fevers, chills, dyspnea etc.      Cough          Review of Systems   Constitutional: Negative.    HENT: Negative.    Respiratory: Positive for cough.    Cardiovascular: Negative.        Physical Exam   Constitutional: He is oriented to person, place, and time. He appears well-developed and well-nourished. No distress.   HENT:   Head: Normocephalic and atraumatic.   Mouth/Throat: Oropharynx is clear and moist.   Cardiovascular: Normal rate and regular rhythm.   Pulmonary/Chest: Effort normal. No stridor. No respiratory distress. He has no wheezes. He has no rales.   He has coarse rhonchi bilaterally which clear with coughing.   Neurological: He is alert and oriented to person, place, and time.   Skin:  He is not diaphoretic.   Nursing note and vitals reviewed.        Problem List Items Addressed This Visit        Respiratory    Acute bronchitis - Primary       Nervous and Auditory    Neck pain    Low back pain       Musculoskeletal and Integument    Degeneration of intervertebral disc of cervical region            PLAN  I gave him a prescription for Zithromax Z-Rigo to take as directed.    A refill the hydrocodone that he takes for his chronic pain.  He feels it works relatively well.    Follow-up as previously arranged.  No Follow-up on file.

## 2019-02-19 ENCOUNTER — TELEPHONE (OUTPATIENT)
Dept: FAMILY MEDICINE CLINIC | Facility: CLINIC | Age: 64
End: 2019-02-19

## 2019-02-19 NOTE — TELEPHONE ENCOUNTER
UPON SPEAKING WITH PT HE IS CURRENTLY IN [place] UNTIL THE END OF NEXT MONTH AND HAS NO ONE TO  MEDICATION. ADVISED PATIENT CANNOT SEND MEDICATION TO HIM OR CALL MEDICATION OUT. PT UNDERSTANDS AND WILL WAIT TILL HE COMES BACK IN TOWN.     ----- Message from Flor Figueroa sent at 2/19/2019 11:38 AM EST -----  PT NEEDS A REFILL ON HIS HYDROCODONE 10/325 QID 5 TIMES A DAY #150    PT SAYS HE OR NO ONE CAN PICK THEM UP PLEASE CALL AND ADVISE   [phone]

## 2019-02-20 ENCOUNTER — TELEPHONE (OUTPATIENT)
Dept: FAMILY MEDICINE CLINIC | Facility: CLINIC | Age: 64
End: 2019-02-20

## 2019-02-20 RX ORDER — BUPROPION HYDROCHLORIDE 150 MG/1
150 TABLET, EXTENDED RELEASE ORAL 2 TIMES DAILY
Qty: 60 TABLET | Refills: 0 | Status: SHIPPED | OUTPATIENT
Start: 2019-02-20 | End: 2019-03-28 | Stop reason: SDUPTHER

## 2019-02-20 NOTE — TELEPHONE ENCOUNTER
ALREADY SPOKE TO PT WHEN RECEIVED MESSAGE, PATIENT HAS ALREADY BEEN NOTIFIED WE CANNOT CALL IN PRESCRIPTION OR SEND IT ACROSS STATE LINES. HE IS OK WITH IT.     ----- Message from Flor Figueroa sent at 2/20/2019  7:22 AM EST -----  He wants it to be called in and I told him we didn't do that so he wanted to talk to dr ochoa and I told him that wasn't possible.  ----- Message -----  From: Agustin Lee MA  Sent: 2/19/2019  12:36 PM  To: Flor Figueroa    What?? He or no one can pick them up??  ----- Message -----  From: Flor Figueroa  Sent: 2/19/2019  11:38 AM  To: Agustin Lee MA    PT NEEDS A REFILL ON HIS HYDROCODONE 10/325 QID 5 TIMES A DAY #150    PT SAYS HE OR NO ONE CAN PICK THEM UP PLEASE CALL AND ADVISE   [phone]         
10

## 2019-02-20 NOTE — TELEPHONE ENCOUNTER
COMPLETED    ----- Message from Joanna Gonzalez sent at 2/19/2019  2:47 PM EST -----  NEEDS A RX FOR WELLBUTRIN ER 150MG # 60   ( GENERIC )     PT NEEDS SENT TO PHARMACY IN [place]     [facility]    FAX   [phone]      PHONE [phone] [address] [facility]     PLEASE CALL WITH ANY QUESTIONS   [phone]

## 2019-03-29 RX ORDER — TERAZOSIN 10 MG/1
10 CAPSULE ORAL NIGHTLY
Qty: 90 CAPSULE | Refills: 0 | Status: SHIPPED | OUTPATIENT
Start: 2019-03-29 | End: 2019-07-09 | Stop reason: SDUPTHER

## 2019-03-29 RX ORDER — BUPROPION HYDROCHLORIDE 150 MG/1
TABLET, EXTENDED RELEASE ORAL
Qty: 60 TABLET | Refills: 5 | Status: SHIPPED | OUTPATIENT
Start: 2019-03-29

## 2019-07-10 RX ORDER — TERAZOSIN 10 MG/1
CAPSULE ORAL
Qty: 90 CAPSULE | Refills: 0 | Status: SHIPPED | OUTPATIENT
Start: 2019-07-10

## 2019-10-03 RX ORDER — TERAZOSIN 10 MG/1
CAPSULE ORAL
Qty: 90 CAPSULE | Refills: 0 | OUTPATIENT
Start: 2019-10-03

## 2020-09-29 ENCOUNTER — TELEPHONE (OUTPATIENT)
Dept: CARDIOLOGY | Facility: CLINIC | Age: 65
End: 2020-09-29

## 2020-09-29 NOTE — TELEPHONE ENCOUNTER
Jolene,    Pt called today. He is wondering if we received echo results from Dr. Kimberley Ingram MD ([address], [place] [address], [phone]).     If so, pt would like to schedule a telephone call with JOHANNA to discuss results.    Thanks,  Hilton

## 2020-10-08 ENCOUNTER — TELEPHONE (OUTPATIENT)
Dept: CARDIOLOGY | Facility: CLINIC | Age: 65
End: 2020-10-08

## 2020-10-08 NOTE — TELEPHONE ENCOUNTER
"When he called Ms Hernandez Valdes back, he was very rude, and stated that \"I would be calling him back within the hour, as a friend, or could expect many terrible online reviews.\"    I have not seen this patient in over two years.  He has permanently moved to [place].  He has a physician there who ordered an echo that I cannot personally review (the images).      I do not remember this patient, and I do not know him outside of work, \"as a friend.\"    I consider his statement to be a threat of defamation/libel.  I will not be calling him.   "

## 2020-10-08 NOTE — TELEPHONE ENCOUNTER
Patient called and scheduled a telehealth visit for today with Dr Baez.  Due to the fact that this patient has not been here in over 2 years and is now a resident of [place], Dr Baez cannot do a telehealth visit with him as she does not have a medical license for the [facility].  I attempted to call the patient to let him know this but had to leave a message on his voice mail.  I have cancelled his appointment.

## 2024-03-20 ENCOUNTER — TELEPHONE (OUTPATIENT)
Dept: FAMILY MEDICINE CLINIC | Facility: CLINIC | Age: 69
End: 2024-03-20

## 2024-03-20 NOTE — TELEPHONE ENCOUNTER
Caller: Octavio Espinoza    Relationship: Self    Best call back number:     [phone]        What is the best time to reach you: ANYTIME    Who are you requesting to speak with (clinical staff, provider,  specific staff member): CLINICAL    What was the call regarding: PATIENT CALLING WANTING TO KNOW IF  WOULD BE WILLING TO SEE HIM HE HASN'T BEEN SEEN SINCE 2018. HE HAD A HEART CATH YESTERDAY AND WOULD LIKE FOR HIM TO LOOK AT IT.HE STATED THE WHOLE ARM IS SWOLLEN.

## 2024-03-21 NOTE — TELEPHONE ENCOUNTER
"Spoke with pt, he was informed he could come back as a new patient BUT he should have contacted cardiologist regarding the swelling in his arm. He also quite possibly should have visited ER. Patient says his arm swelling has subsided.    He now has an issue regarding neck and back pain. He says it has \"been going on for a long time.\"    Patient informed I will speak with PCP about getting him scheduled to be evaluated for his neck and back pain.   "

## 2024-04-04 ENCOUNTER — OFFICE VISIT (OUTPATIENT)
Dept: FAMILY MEDICINE CLINIC | Facility: CLINIC | Age: 69
End: 2024-04-04
Payer: MEDICARE

## 2024-04-04 ENCOUNTER — TELEPHONE (OUTPATIENT)
Dept: FAMILY MEDICINE CLINIC | Facility: CLINIC | Age: 69
End: 2024-04-04

## 2024-04-04 VITALS
HEART RATE: 79 BPM | WEIGHT: 171.6 LBS | BODY MASS INDEX: 24.57 KG/M2 | OXYGEN SATURATION: 99 % | RESPIRATION RATE: 16 BRPM | HEIGHT: 70 IN | DIASTOLIC BLOOD PRESSURE: 79 MMHG | TEMPERATURE: 97 F | SYSTOLIC BLOOD PRESSURE: 118 MMHG

## 2024-04-04 DIAGNOSIS — M48.02 DEGENERATIVE CERVICAL SPINAL STENOSIS: Primary | ICD-10-CM

## 2024-04-04 DIAGNOSIS — M54.12 CERVICAL RADICULOPATHY: ICD-10-CM

## 2024-04-04 RX ORDER — CLOPIDOGREL BISULFATE 75 MG/1
75 TABLET ORAL DAILY
COMMUNITY
Start: 2024-03-30 | End: 2024-06-30

## 2024-04-04 RX ORDER — PANTOPRAZOLE SODIUM 40 MG/1
40 TABLET, DELAYED RELEASE ORAL DAILY
COMMUNITY
Start: 2024-03-30 | End: 2024-06-30

## 2024-04-04 RX ORDER — ASPIRIN 81 MG/1
81 TABLET ORAL DAILY
COMMUNITY
Start: 2024-03-19 | End: 2024-06-17

## 2024-04-04 RX ORDER — HYDROCODONE BITARTRATE AND ACETAMINOPHEN 10; 325 MG/1; MG/1
1 TABLET ORAL
Qty: 150 TABLET | Refills: 0 | Status: SHIPPED | OUTPATIENT
Start: 2024-04-04 | End: 2024-04-04 | Stop reason: SDUPTHER

## 2024-04-04 RX ORDER — HYDROCODONE BITARTRATE AND ACETAMINOPHEN 10; 325 MG/1; MG/1
1 TABLET ORAL
Qty: 150 TABLET | Refills: 0 | Status: CANCELLED | OUTPATIENT
Start: 2024-04-04

## 2024-04-04 RX ORDER — ARGININE 500 MG
500 TABLET ORAL DAILY
COMMUNITY
End: 2024-04-04

## 2024-04-04 RX ORDER — HYDROCODONE BITARTRATE AND ACETAMINOPHEN 10; 325 MG/1; MG/1
1 TABLET ORAL
Qty: 150 TABLET | Refills: 0 | Status: SHIPPED | OUTPATIENT
Start: 2024-04-04 | End: 2024-04-05 | Stop reason: SDUPTHER

## 2024-04-04 RX ORDER — DEXTROAMPHETAMINE SACCHARATE, AMPHETAMINE ASPARTATE, DEXTROAMPHETAMINE SULFATE AND AMPHETAMINE SULFATE 2.5; 2.5; 2.5; 2.5 MG/1; MG/1; MG/1; MG/1
TABLET ORAL
COMMUNITY
Start: 2024-01-24 | End: 2024-04-04

## 2024-04-04 NOTE — PROGRESS NOTES
Subjective   Octavio Espinoza is a 69 y.o. male. Patient is here today for   Chief Complaint   Patient presents with    Pain     Back and neck pain    Establish Care          Vitals:    24 0941   BP: 118/79   Pulse: 79   Resp: 16   Temp: 97 °F (36.1 °C)   SpO2: 99%     Body mass index is 24.62 kg/m².      Past Medical History:   Diagnosis Date    ADHD (attention deficit hyperactivity disorder)     Anxiety     Asthma     Back pain     Chronic cough     Chronic rhinitis     Colon polyp     DDD (degenerative disc disease), cervical     Depression     Erectile dysfunction     History of colon polyps       No Known Allergies   Social History     Socioeconomic History    Marital status:     Years of education: Postgraduate   Tobacco Use    Smoking status: Former     Current packs/day: 0.00     Types: Cigarettes     Quit date:      Years since quittin.2    Smokeless tobacco: Never    Tobacco comments:     caffeine use: 4 cups daily.   Substance and Sexual Activity    Alcohol use: No     Comment: Rare     Drug use: No        Current Outpatient Medications:     aspirin 81 MG EC tablet, Take 1 tablet by mouth Daily., Disp: , Rfl:     clopidogrel (PLAVIX) 75 MG tablet, Take 1 tablet by mouth Daily., Disp: , Rfl:     finasteride (PROSCAR) 5 MG tablet, Take 1 tablet by mouth Daily., Disp: 90 tablet, Rfl: 3    fluticasone (FLOVENT HFA) 110 MCG/ACT inhaler, Inhale 1 puff 2 (Two) Times a Day., Disp: 12 g, Rfl: 5    gabapentin (NEURONTIN) 600 MG tablet, TAKE ONE TABLET BY MOUTH 4 TIMES DAILY, Disp: 360 tablet, Rfl: 1    Multiple Vitamins-Minerals (MULTIVITAMIN ADULT PO), Take  by mouth Daily., Disp: , Rfl:     Omega-3 Fatty Acids (FISH OIL PO), Take  by mouth Daily., Disp: , Rfl:     pantoprazole (PROTONIX) 40 MG EC tablet, Take 1 tablet by mouth Daily., Disp: , Rfl:     terazosin (HYTRIN) 10 MG capsule, TAKE 1 CAPSULE BY MOUTH AT BEDTIME, Disp: 90 capsule, Rfl: 0    FLUARIX QUADRIVALENT 0.5 ML suspension  prefilled syringe injection, , Disp: , Rfl:     fluticasone (FLONASE) 50 MCG/ACT nasal spray, SPR ONCE IEN BID, Disp: , Rfl: 2    HYDROcodone-acetaminophen (NORCO)  MG per tablet, Take 1 tablet by mouth Every 3 (Three) Hours., Disp: 150 tablet, Rfl: 0     Objective     History of Present Illness  It is nice to see this patient again.  He had moved out of town to [place].  He and I had known each other for some time though he moved to [place] for the past 7 years.    Recently while there he was determined to have critical aortic stenosis and he decided to come to local to have his aortic valve replacement.    He is going to be heading back down to [place] but he needed a prescription refill for his hydrocodone .  He has chronic neck pain and chronic low back pain.  He has a history of degenerative cervical spinal stenosis and degeneration of the intervertebral disc of his cervical spine and cervical radiculopathy.        I have been accustomed to writing this prescription for him while he lived in [facility].    He had an aortic valve replacement last week at [facility].    He is feeling okay now he is feeling a bit tired.  Pain         Review of Systems   Constitutional: Negative.    HENT: Negative.     Respiratory: Negative.     Cardiovascular: Negative.    Musculoskeletal:         He has severe chronic cervical spine pain with right upper extremity radicular pain.   Psychiatric/Behavioral: Negative.         Physical Exam  Vitals and nursing note reviewed.   Constitutional:       Appearance: Normal appearance.      Comments: Pleasant, neatly groomed, no distress.   Cardiovascular:      Rate and Rhythm: Regular rhythm.      Heart sounds: Normal heart sounds. No murmur heard.  Neurological:      Mental Status: He is alert and oriented to person, place, and time.   Psychiatric:         Mood and Affect: Mood normal.         Behavior: Behavior normal.         Thought Content: Thought content  normal.           Problems Addressed this Visit          Neuro    Degenerative cervical spinal stenosis - Primary    Cervical radiculopathy     Diagnoses         Codes Comments    Degenerative cervical spinal stenosis    -  Primary ICD-10-CM: M48.02  ICD-9-CM: 723.0     Cervical radiculopathy     ICD-10-CM: M54.12  ICD-9-CM: 723.4               PLAN  I refilled his hydrocodone for him today.  I asked him to follow-up in about a month.  If he is still here before moving back down to [place].  No follow-ups on file.

## 2024-04-04 NOTE — TELEPHONE ENCOUNTER
"SPOKE WITH VICENTE, CANNOT BE CALLED IN.    RX HAS \"PRINT\", WILL YOU PLEASE RE-SEND? THANKS! I HAVE PENDED  "

## 2024-04-04 NOTE — TELEPHONE ENCOUNTER
"  Caller: Octavio Espinoza    Relationship: Self    Best call back number: [phone]*    What is the best time to reach you: ANYTIME    Who are you requesting to speak with (clinical staff, provider,  specific staff member): CLINICAL    What was the call regarding: PATIENT CALLING STATING THAT [facility] DID NOT GET THE PRESCRIPTION ORDER TODAY FOR HYDROcodone-acetaminophen (NORCO)  MG per tablet . THE STATUS OF THE PRESCRIPTION IN EPIC SHOWS \"NO PRINT\". THE PATIENT IS REQUESTING PRESCRIPTION TO BE RESENT TO [facility], OR DR. WADSWORTH CAN CALL THE [facility] DOCTOR CALL TEAM [phone] TO ORDER MEDICATION.    PATIENT REQUEST A COURTESY CALL TO LET HIM KNOW WHEN ORDER HAS BEEN SENT TO [facility].      "

## 2024-04-05 RX ORDER — HYDROCODONE BITARTRATE AND ACETAMINOPHEN 10; 325 MG/1; MG/1
1 TABLET ORAL
Qty: 150 TABLET | Refills: 0 | Status: SHIPPED | OUTPATIENT
Start: 2024-04-05

## 2024-04-10 ENCOUNTER — TELEPHONE (OUTPATIENT)
Dept: PEDIATRICS | Facility: OTHER | Age: 69
End: 2024-04-10
Payer: MEDICARE

## 2024-04-10 ENCOUNTER — TELEPHONE (OUTPATIENT)
Dept: FAMILY MEDICINE CLINIC | Facility: CLINIC | Age: 69
End: 2024-04-10

## 2024-04-10 DIAGNOSIS — Z95.2 STATUS POST AORTIC VALVE REPLACEMENT: Primary | ICD-10-CM

## 2024-04-10 NOTE — TELEPHONE ENCOUNTER
A user error has taken place: encounter opened in error, closed for administrative reasons.     PLEASE DELETE.

## 2024-04-22 ENCOUNTER — APPOINTMENT (OUTPATIENT)
Dept: GENERAL RADIOLOGY | Facility: HOSPITAL | Age: 69
End: 2024-04-22
Payer: MEDICARE

## 2024-04-22 ENCOUNTER — HOSPITAL ENCOUNTER (EMERGENCY)
Facility: HOSPITAL | Age: 69
Discharge: HOME | End: 2024-04-22
Attending: EMERGENCY MEDICINE | Admitting: EMERGENCY MEDICINE
Payer: MEDICARE

## 2024-04-22 ENCOUNTER — APPOINTMENT (OUTPATIENT)
Dept: CT IMAGING | Facility: HOSPITAL | Age: 69
End: 2024-04-22
Payer: MEDICARE

## 2024-04-22 VITALS
DIASTOLIC BLOOD PRESSURE: 94 MMHG | HEART RATE: 69 BPM | RESPIRATION RATE: 16 BRPM | TEMPERATURE: 98.7 F | OXYGEN SATURATION: 98 % | SYSTOLIC BLOOD PRESSURE: 119 MMHG

## 2024-04-22 DIAGNOSIS — R07.89 ATYPICAL CHEST PAIN: Primary | ICD-10-CM

## 2024-04-22 LAB
ALBUMIN SERPL-MCNC: 4.5 G/DL (ref 3.5–5.2)
ALBUMIN/GLOB SERPL: 1.8 G/DL
ALP SERPL-CCNC: 102 U/L (ref 39–117)
ALT SERPL W P-5'-P-CCNC: 17 U/L (ref 1–41)
ANION GAP SERPL CALCULATED.3IONS-SCNC: 11 MMOL/L (ref 5–15)
AST SERPL-CCNC: 21 U/L (ref 1–40)
BASOPHILS # BLD AUTO: 0.03 10*3/MM3 (ref 0–0.2)
BASOPHILS NFR BLD AUTO: 0.5 % (ref 0–1.5)
BILIRUB SERPL-MCNC: 0.4 MG/DL (ref 0–1.2)
BUN SERPL-MCNC: 6 MG/DL (ref 8–23)
BUN/CREAT SERPL: 6 (ref 7–25)
CALCIUM SPEC-SCNC: 9.4 MG/DL (ref 8.6–10.5)
CHLORIDE SERPL-SCNC: 102 MMOL/L (ref 98–107)
CO2 SERPL-SCNC: 25 MMOL/L (ref 22–29)
CREAT SERPL-MCNC: 1 MG/DL (ref 0.76–1.27)
D DIMER PPP FEU-MCNC: 1.5 MCGFEU/ML (ref 0–0.71)
DEPRECATED RDW RBC AUTO: 43.7 FL (ref 37–54)
EGFRCR SERPLBLD CKD-EPI 2021: 80.5 ML/MIN/1.73
EOSINOPHIL # BLD AUTO: 0.18 10*3/MM3 (ref 0–0.4)
EOSINOPHIL NFR BLD AUTO: 3.2 % (ref 0.3–6.2)
ERYTHROCYTE [DISTWIDTH] IN BLOOD BY AUTOMATED COUNT: 12.4 % (ref 12.3–15.4)
GEN 5 2HR TROPONIN T REFLEX: 17 NG/L
GLOBULIN UR ELPH-MCNC: 2.5 GM/DL
GLUCOSE SERPL-MCNC: 125 MG/DL (ref 65–99)
HCT VFR BLD AUTO: 42.3 % (ref 37.5–51)
HGB BLD-MCNC: 14 G/DL (ref 13–17.7)
HOLD SPECIMEN: NORMAL
HOLD SPECIMEN: NORMAL
LYMPHOCYTES # BLD AUTO: 1.31 10*3/MM3 (ref 0.7–3.1)
LYMPHOCYTES NFR BLD AUTO: 23 % (ref 19.6–45.3)
MCH RBC QN AUTO: 31.3 PG (ref 26.6–33)
MCHC RBC AUTO-ENTMCNC: 33.1 G/DL (ref 31.5–35.7)
MCV RBC AUTO: 94.6 FL (ref 79–97)
MONOCYTES # BLD AUTO: 0.34 10*3/MM3 (ref 0.1–0.9)
MONOCYTES NFR BLD AUTO: 6 % (ref 5–12)
NEUTROPHILS NFR BLD AUTO: 3.82 10*3/MM3 (ref 1.7–7)
NEUTROPHILS NFR BLD AUTO: 67.1 % (ref 42.7–76)
PLATELET # BLD AUTO: 156 10*3/MM3 (ref 140–450)
PMV BLD AUTO: 10.6 FL (ref 6–12)
POTASSIUM SERPL-SCNC: 4.3 MMOL/L (ref 3.5–5.2)
PROT SERPL-MCNC: 7 G/DL (ref 6–8.5)
QT INTERVAL: 351 MS
QTC INTERVAL: 400 MS
RBC # BLD AUTO: 4.47 10*6/MM3 (ref 4.14–5.8)
SODIUM SERPL-SCNC: 138 MMOL/L (ref 136–145)
TROPONIN T DELTA: 1 NG/L
TROPONIN T SERPL HS-MCNC: 16 NG/L
WBC NRBC COR # BLD AUTO: 5.69 10*3/MM3 (ref 3.4–10.8)
WHOLE BLOOD HOLD COAG: NORMAL
WHOLE BLOOD HOLD SPECIMEN: NORMAL

## 2024-04-22 PROCEDURE — 99285 EMERGENCY DEPT VISIT HI MDM: CPT

## 2024-04-22 PROCEDURE — 36415 COLL VENOUS BLD VENIPUNCTURE: CPT

## 2024-04-22 PROCEDURE — 71045 X-RAY EXAM CHEST 1 VIEW: CPT

## 2024-04-22 PROCEDURE — 85025 COMPLETE CBC W/AUTO DIFF WBC: CPT | Performed by: EMERGENCY MEDICINE

## 2024-04-22 PROCEDURE — 80053 COMPREHEN METABOLIC PANEL: CPT | Performed by: EMERGENCY MEDICINE

## 2024-04-22 PROCEDURE — 93010 ELECTROCARDIOGRAM REPORT: CPT | Performed by: INTERNAL MEDICINE

## 2024-04-22 PROCEDURE — 25510000001 IOPAMIDOL PER 1 ML: Performed by: EMERGENCY MEDICINE

## 2024-04-22 PROCEDURE — 85379 FIBRIN DEGRADATION QUANT: CPT | Performed by: EMERGENCY MEDICINE

## 2024-04-22 PROCEDURE — 84484 ASSAY OF TROPONIN QUANT: CPT | Performed by: EMERGENCY MEDICINE

## 2024-04-22 PROCEDURE — 93005 ELECTROCARDIOGRAM TRACING: CPT

## 2024-04-22 PROCEDURE — 71275 CT ANGIOGRAPHY CHEST: CPT

## 2024-04-22 PROCEDURE — 93005 ELECTROCARDIOGRAM TRACING: CPT | Performed by: EMERGENCY MEDICINE

## 2024-04-22 RX ORDER — SODIUM CHLORIDE 0.9 % (FLUSH) 0.9 %
10 SYRINGE (ML) INJECTION AS NEEDED
Status: DISCONTINUED | OUTPATIENT
Start: 2024-04-22 | End: 2024-04-22 | Stop reason: HOSPADM

## 2024-04-22 RX ORDER — ASPIRIN 325 MG
325 TABLET ORAL ONCE
Status: DISCONTINUED | OUTPATIENT
Start: 2024-04-22 | End: 2024-04-22 | Stop reason: HOSPADM

## 2024-04-22 RX ADMIN — IOPAMIDOL 95 ML: 755 INJECTION, SOLUTION INTRAVENOUS at 14:31

## 2024-04-22 NOTE — DISCHARGE INSTRUCTIONS
Follow-up with your echo as scheduled for tomorrow.  [facility] is happy to see you in follow-up.  Return if worsening of symptoms as we discussed or any other concerns.

## 2024-04-22 NOTE — ED PROVIDER NOTES
EMERGENCY DEPARTMENT ENCOUNTER    Room Number:  40/40  Date of encounter:  4/22/2024  PCP: Freddie Coreas MD  Historian: Patient  Relevant information and history provided by sources other than the patient will be included below and in the ED Course.  Review of pertinent past medical records may also be included in record below and ED Course.    HPI:  Chief Complaint: Chest pain  A complete HPI/ROS/PMH/PSH/SH/FH are unobtainable due to: Not applicable  Context: Octavio Espinoza is a 69 y.o. male who presents to the ED c/o this is just a gentleman who has had episodic chest pain since yesterday.  It is just right of midline kind of area of his breast.  He states that it is a nagging pain.  He is not aware of anything that makes it better or worse.  Is not worse with deep breathing there is no definitive association with exertion.  He did have a TAVR that was done about 1-1/2 months ago at [facility] because he had severe aortic stenosis.  He and unfortunately had a complication per his history of a pericardial effusion and it need to be drained.  He is very concerned and skeptical about the care that he received in [facility] because he had 2 different care providers that provided different reasons why he developed the pericardial effusion.  He states that he is lack confidence in the care and he would like to transfer care to another cardiologist.  He has seen [facility] in the past.  At this time he does not have any pain.  Does not have any fevers or chills.  Denies any shortness of breath.  Denies any abdominal pain or new swelling.        Previous Episodes: No  Current Symptoms: See above    MEDICAL HISTORY REVIEWED  I reviewed the echo that was done by [facility] on 7/17/2018.  Has mild LVH at that time.  Left regular systolic function was normal EF was 60% mild to moderate aortic valve stenosis was present.  There was a history of potential dilated aorta in the past.  Echo does not  reveal this.  Patient has a history of enlarged prostate.  Anxiety depression ADHD.  History of chronic back and neck pain.  I reviewed his medicine list.      PAST MEDICAL HISTORY  Active Ambulatory Problems     Diagnosis Date Noted    Mixed anxiety depressive disorder 2016    Neck pain 2016    Degenerative cervical spinal stenosis 2016    Low back pain 2016    Attention or concentration deficit 2016    Degeneration of intervertebral disc of cervical region 2016    Cervical radiculopathy 2016    Libido, decreased 2016    Well adult exam 10/24/2016    Heme positive stool 10/24/2016    Enlarged prostate 2016    Male erectile disorder 2016    History of colon polyps 2017    Hyperglycemia 12/10/2017    Acute bronchitis 2018     Resolved Ambulatory Problems     Diagnosis Date Noted    Pain in extremity 2016    Acute sinusitis 2017    Change in bowel habits 2017     Past Medical History:   Diagnosis Date    ADHD (attention deficit hyperactivity disorder)     Anxiety     Asthma     Back pain     Chronic cough     Chronic rhinitis     Colon polyp     DDD (degenerative disc disease), cervical     Depression     Erectile dysfunction          PAST SURGICAL HISTORY  Past Surgical History:   Procedure Laterality Date    AORTIC VALVE SURGERY  2024    [facility]    COLONOSCOPY  2015    Wade Rome MD         FAMILY HISTORY  Family History   Problem Relation Age of Onset    Cancer Other     Dementia Other     Squamous cell carcinoma Mother     Lung cancer Mother 44    Emphysema Father          SOCIAL HISTORY  Social History     Socioeconomic History    Marital status:     Years of education: Postgraduate   Tobacco Use    Smoking status: Former     Current packs/day: 0.00     Types: Cigarettes     Quit date:      Years since quittin.3    Smokeless tobacco: Never    Tobacco comments:     caffeine use: 4 cups daily.    Substance and Sexual Activity    Alcohol use: No     Comment: Rare     Drug use: No         ALLERGIES  Patient has no known allergies.        REVIEW OF SYSTEMS  Review of Systems     All systems reviewed and negative except for those discussed in HPI.       PHYSICAL EXAM    I have reviewed the triage vital signs and nursing notes.    ED Triage Vitals   Temp Heart Rate Resp BP SpO2   04/22/24 1322 04/22/24 1322 04/22/24 1328 04/22/24 1327 04/22/24 1322   98.7 °F (37.1 °C) 92 16 150/86 98 %      Temp src Heart Rate Source Patient Position BP Location FiO2 (%)   04/22/24 1322 04/22/24 1322 -- -- --   Tympanic Monitor          GENERAL: Anxious elderly male.  No acute distress.Vital signs on my initial evaluation have been reviewed  HENT: nares patent  Head/neck/ face are symmetric without gross deformity, signs of trauma, or swelling  EYES: no scleral icterus, no conjunctival pallor.  NECK: Supple, no meningismus  CV: regular rhythm, regular rate with intact distal pulses.  Systolic murmur 2 out of 6.  Normal inspection to his chest.  His location of pain when it did occur was just right of his sternum.  Is not reproduced with deep breathing or with palpation.  RESPIRATORY: normal effort and no respiratory distress.  Clear to auscultation bilaterally  ABDOMEN: soft and nontender.  MUSCULOSKELETAL: no deformity.  Intact distal pulses that are equal strong and symmetric.  No coolness or cyanosis  NEURO: alert and appropriate, moves all extremities, follows commands.  No focal motor or sensory changes  SKIN: warm, dry    Vital signs and nursing notes reviewed.        LAB RESULTS  Recent Results (from the past 24 hour(s))   ECG 12 Lead ED Triage Standing Order; Chest Pain    Collection Time: 04/22/24  1:27 PM   Result Value Ref Range    QT Interval 351 ms    QTC Interval 400 ms   Comprehensive Metabolic Panel    Collection Time: 04/22/24  1:38 PM    Specimen: Blood   Result Value Ref Range    Glucose 125 (H) 65 - 99 mg/dL     BUN 6 (L) 8 - 23 mg/dL    Creatinine 1.00 0.76 - 1.27 mg/dL    Sodium 138 136 - 145 mmol/L    Potassium 4.3 3.5 - 5.2 mmol/L    Chloride 102 98 - 107 mmol/L    CO2 25.0 22.0 - 29.0 mmol/L    Calcium 9.4 8.6 - 10.5 mg/dL    Total Protein 7.0 6.0 - 8.5 g/dL    Albumin 4.5 3.5 - 5.2 g/dL    ALT (SGPT) 17 1 - 41 U/L    AST (SGOT) 21 1 - 40 U/L    Alkaline Phosphatase 102 39 - 117 U/L    Total Bilirubin 0.4 0.0 - 1.2 mg/dL    Globulin 2.5 gm/dL    A/G Ratio 1.8 g/dL    BUN/Creatinine Ratio 6.0 (L) 7.0 - 25.0    Anion Gap 11.0 5.0 - 15.0 mmol/L    eGFR 80.5 >60.0 mL/min/1.73   High Sensitivity Troponin T    Collection Time: 04/22/24  1:38 PM    Specimen: Blood   Result Value Ref Range    HS Troponin T 16 <22 ng/L   Green Top (Gel)    Collection Time: 04/22/24  1:38 PM   Result Value Ref Range    Extra Tube Hold for add-ons.    Lavender Top    Collection Time: 04/22/24  1:38 PM   Result Value Ref Range    Extra Tube hold for add-on    Gold Top - SST    Collection Time: 04/22/24  1:38 PM   Result Value Ref Range    Extra Tube Hold for add-ons.    Light Blue Top    Collection Time: 04/22/24  1:38 PM   Result Value Ref Range    Extra Tube Hold for add-ons.    CBC Auto Differential    Collection Time: 04/22/24  1:38 PM    Specimen: Blood   Result Value Ref Range    WBC 5.69 3.40 - 10.80 10*3/mm3    RBC 4.47 4.14 - 5.80 10*6/mm3    Hemoglobin 14.0 13.0 - 17.7 g/dL    Hematocrit 42.3 37.5 - 51.0 %    MCV 94.6 79.0 - 97.0 fL    MCH 31.3 26.6 - 33.0 pg    MCHC 33.1 31.5 - 35.7 g/dL    RDW 12.4 12.3 - 15.4 %    RDW-SD 43.7 37.0 - 54.0 fl    MPV 10.6 6.0 - 12.0 fL    Platelets 156 140 - 450 10*3/mm3    Neutrophil % 67.1 42.7 - 76.0 %    Lymphocyte % 23.0 19.6 - 45.3 %    Monocyte % 6.0 5.0 - 12.0 %    Eosinophil % 3.2 0.3 - 6.2 %    Basophil % 0.5 0.0 - 1.5 %    Neutrophils, Absolute 3.82 1.70 - 7.00 10*3/mm3    Lymphocytes, Absolute 1.31 0.70 - 3.10 10*3/mm3    Monocytes, Absolute 0.34 0.10 - 0.90 10*3/mm3    Eosinophils,  Absolute 0.18 0.00 - 0.40 10*3/mm3    Basophils, Absolute 0.03 0.00 - 0.20 10*3/mm3   D-dimer, Quantitative    Collection Time: 04/22/24  1:38 PM    Specimen: Blood   Result Value Ref Range    D-Dimer, Quantitative 1.50 (H) 0.00 - 0.71 MCGFEU/mL   High Sensitivity Troponin T 2Hr    Collection Time: 04/22/24  4:12 PM    Specimen: Arm, Left; Blood   Result Value Ref Range    HS Troponin T 17 <22 ng/L    Troponin T Delta 1 >=-4 - <+4 ng/L       Ordered the above labs and independently reviewed the results.        RADIOLOGY  CT Angiogram Chest    Result Date: 4/22/2024  CT ANGIOGRAM CHEST-  INDICATION: Concern for pulmonary embolism. Chest pain.  COMPARISON: CTA chest June 20, 2018  TECHNIQUE: Routine CTA chest with IV contrast. Coronal and sagittal reformats. Three dimensional reconstructions. Radiation dose reduction techniques were utilized, including automated exposure control and exposure modulation based on body size.  FINDINGS:  Pulmonary arteries: No pulmonary embolism.  Chest wall: No lymphadenopathy.  Thyroid: Visualized thyroid is normal.  Mediastinum: Coronary artery atherosclerotic calcifications. Heart is normal in size. TAVR. No pericardial effusion. Dilatation to the ascending thoracic aorta measuring 4.5 x 4.4 cm, previously measuring 4.6 x 4.4 cm, unchanged. No mediastinal or hilar lymphadenopathy.  Lungs/pleura: No effusions. Patent central airways. Posterior dependent subsegmental atelectasis.  Upper abdomen: Incidental splenule.  Osseous structures: No destructive osseous lesions.       1. No pulmonary embolism. 2. Interval TAVR. 3. Stable dilatation to the ascending thoracic aorta.  This report was finalized on 4/22/2024 2:43 PM by Dr. Chago Johnston M.D on Workstation: [id number]      XR Chest 1 View    Result Date: 4/22/2024  XR CHEST 1 VW-4/22/2024  HISTORY: Chest pain.  Heart size is within normal limits. Lungs appear free of acute infiltrates. There are minor degenerative changes in the  spine. Bones and soft tissues are otherwise unremarkable.      1. No acute process.   This report was finalized on 4/22/2024 2:14 PM by Dr. Miguel Fritz M.D on Workstation: [id number]       I ordered the above noted radiological studies. Reviewed by me and discussed with radiologist.  See dictation for official radiology interpretation.      PROCEDURES    Procedures      MEDICATIONS GIVEN IN ER    Medications   sodium chloride 0.9 % flush 10 mL (has no administration in time range)   aspirin tablet 325 mg (325 mg Oral Not Given 4/22/24 1350)   iopamidol (ISOVUE-370) 76 % injection 100 mL (95 mL Intravenous Given by Other 4/22/24 1431)         All labs have been independently reviewed by me.  All radiology studies have been reviewed by me and I discussed with radiologist dictating the report when indicated below.  All EKG's independently viewed and interpreted by me.  Discussion below represents my analysis of pertinent findings related to patient's condition, differential diagnosis, treatment plan and final disposition.        PROGRESS, DATA ANALYSIS, CONSULTS, AND MEDICAL DECISION MAKING    DDx includes acute coronary syndrome, pulmonary embolism, thoracic aortic dissection, pneumonia, pneumothorax, musculoskeletal pain, GERD or esophageal spasm, PUD, esophagitis, anxiety, myocarditis/pericarditis, esophageal rupture, pancreatitis.         ED Course as of 04/22/24 1650   Mon Apr 22, 2024   1335 My own independent interpretation of the EKG that was done at 1:27 PM reveals a rate of 78 it is sinus rhythm there is very mild intravelar conduction delay with normal axis I do not appreciate any acute injury pattern.  There is some nonspecific T wave changes in several leads  QT looks unremarkable  I compared with the EKG that was done on 7/20/2018 and the EKGs look fairly similar the T waves on this EKG are more flat or not as prominent.  These are nonspecific changes. [MM]   1417 D-Dimer, Quant(!): 1.50 [MM]   1449  HS Troponin T: 16 [MM]   1450 CT scan of the chest is unremarkable.  No pulmonary embolism.  Has status post TAVR.  No focal pulmonary consolidation.  Has stable thoracic aortic dilatation.  No signs of aortic dissection.  Please see complete dictated report from radiologist. [MM]   1544 I discussed the case with Dr. López who is on for cardiology.  Informed him of the patient's presenting symptoms and results of the test.  He thinks we could get him into [facility] office and possibly see a nurse practitioner this week.  They are happy to see and provide a second opinion.  He does not think this patient needs admission or admission to observation it.  I agree with him.  His symptoms are very atypical and he is completely asymptomatic at this time.  All questions answered. [MM]   1545 I talked with the patient and informed him of my conversation with the cardiologist as well as the results of the CT scan and lab work.  He is scheduled to have an echo done tomorrow.  I informed him I would do that echo.  And I did inform [facility] will see him in follow-up for second opinion.  Patient agrees with that plan.  He is remained asymptomatic here.  All questions answered. [MM]   1548 This patient's chest pain is very atypical for anything concerning.  He is not having any pain here.  Is the right side of his chest.  It is not worse with breathing or moving or exertion.  His cardiac enzymes are flat his EKG does not show any acute process.  I think this is very unlikely any serious etiology or serious cause of his pain.  Shared decision making was made with the patient.  I did inform the patient there is no test that is 100% sensitive to ruling out all serious conditions.  I gave him warning signs to return to the emergency department such as worsening of symptoms, fever, shortness of breath, persistent pain, or any other concerns.  All questions answered and patient agrees with treatment plan.  [MM]      ED Course User Index  [MM] Octavio Jimenez MD       AS OF 16:50 EDT VITALS:    BP - 119/94  HR - 69  TEMP - 98.7 °F (37.1 °C) (Tympanic)  02 SATS - 98%    SOCIAL DETERMINANTS OF HEALTH THAT IMPACT OR LIMIT CARE (For example..Homelessness,safe discharge, inability to obtain care, follow up, or prescriptions):      DIAGNOSIS  Final diagnoses:   Atypical chest pain         DISPOSITION  DISCHARGE    Patient discharged in stable condition.    Reviewed implications of results, diagnosis, meds, responsibility to follow up, warning signs and symptoms of possible worsening, potential complications and reasons to return to ER, including worsening of symptoms, worsening of pain, shortness of breath, fever, chills, any concerns..    Patient/Family voiced understanding of above instructions.    Discussed plan for discharge, as there is no emergent indication for admission. Pt/family is agreeable and understands need for follow up and repeat testing.  Pt is aware that discharge does not mean that nothing is wrong but it indicates no emergency is present that requires admission and they must continue care with follow-up as given below or physician of their choice.     FOLLOW-UP  [facility] MED[facility]ARDIOLOG[facility]  [address]  [place] [facility] [id number] [phone]    Call today when you get home or tomorrow to arrange follow-up in the next several days.  Return if chest pain returns and is consistent.  Shortness of breath, fever, chills, any concerns.         Medication List      No changes were made to your prescriptions during this visit.                 DICTATED UTILIZING DRAGON DICTATION    Note Disclaimer: At [facility], we believe that sharing information builds trust and better relationships. You are receiving this note because you recently visited [facility]. It is possible you will see health information before a provider has talked with you about it. This kind of information can  be easy to misunderstand. To help you fully understand what it means for your health, we urge you to discuss this note with your provider.       Octavio Jimenez MD  04/22/24 1668